# Patient Record
Sex: MALE | Race: WHITE | Employment: OTHER | URBAN - METROPOLITAN AREA
[De-identification: names, ages, dates, MRNs, and addresses within clinical notes are randomized per-mention and may not be internally consistent; named-entity substitution may affect disease eponyms.]

---

## 2017-01-25 ENCOUNTER — NURSE ONLY (OUTPATIENT)
Dept: FAMILY MEDICINE CLINIC | Facility: CLINIC | Age: 67
End: 2017-01-25

## 2017-01-25 DIAGNOSIS — IMO0001 UNCONTROLLED TYPE 2 DIABETES MELLITUS WITHOUT COMPLICATION, WITHOUT LONG-TERM CURRENT USE OF INSULIN: ICD-10-CM

## 2017-01-25 DIAGNOSIS — E78.1 PURE HYPERGLYCERIDEMIA: ICD-10-CM

## 2017-01-25 LAB
ALBUMIN SERPL BCP-MCNC: 4.5 G/DL (ref 3.5–4.8)
ALBUMIN/GLOB SERPL: 2 {RATIO} (ref 1–2)
ALP SERPL-CCNC: 42 U/L (ref 32–100)
ALT SERPL-CCNC: 29 U/L (ref 17–63)
ANION GAP SERPL CALC-SCNC: 11 MMOL/L (ref 0–18)
AST SERPL-CCNC: 23 U/L (ref 15–41)
BILIRUB SERPL-MCNC: 0.9 MG/DL (ref 0.3–1.2)
BUN SERPL-MCNC: 19 MG/DL (ref 8–20)
BUN/CREAT SERPL: 17 (ref 10–20)
CALCIUM SERPL-MCNC: 9.6 MG/DL (ref 8.5–10.5)
CHLORIDE SERPL-SCNC: 104 MMOL/L (ref 95–110)
CHOLEST SERPL-MCNC: 167 MG/DL (ref 110–200)
CO2 SERPL-SCNC: 25 MMOL/L (ref 22–32)
CREAT SERPL-MCNC: 1.12 MG/DL (ref 0.5–1.5)
GLOBULIN PLAS-MCNC: 2.3 G/DL (ref 2.5–3.7)
GLUCOSE SERPL-MCNC: 170 MG/DL (ref 70–99)
HDLC SERPL-MCNC: 31 MG/DL
LDLC SERPL CALC-MCNC: 107 MG/DL (ref 0–99)
NONHDLC SERPL-MCNC: 136 MG/DL
OSMOLALITY UR CALC.SUM OF ELEC: 296 MOSM/KG (ref 275–295)
POTASSIUM SERPL-SCNC: 4.3 MMOL/L (ref 3.3–5.1)
PROT SERPL-MCNC: 6.8 G/DL (ref 5.9–8.4)
SODIUM SERPL-SCNC: 140 MMOL/L (ref 136–144)
TRIGL SERPL-MCNC: 143 MG/DL (ref 1–149)

## 2017-01-25 PROCEDURE — 83036 HEMOGLOBIN GLYCOSYLATED A1C: CPT

## 2017-01-25 PROCEDURE — 80061 LIPID PANEL: CPT

## 2017-01-25 PROCEDURE — 36415 COLL VENOUS BLD VENIPUNCTURE: CPT

## 2017-01-25 PROCEDURE — 80053 COMPREHEN METABOLIC PANEL: CPT

## 2017-01-25 RX ORDER — FENOFIBRATE 160 MG/1
TABLET ORAL
Qty: 30 TABLET | Refills: 0 | Status: SHIPPED | OUTPATIENT
Start: 2017-01-25 | End: 2017-02-02

## 2017-01-26 LAB — HBA1C MFR BLD: 8.1 % (ref 4–6)

## 2017-02-02 ENCOUNTER — OFFICE VISIT (OUTPATIENT)
Dept: FAMILY MEDICINE CLINIC | Facility: CLINIC | Age: 67
End: 2017-02-02

## 2017-02-02 VITALS
OXYGEN SATURATION: 98 % | HEART RATE: 80 BPM | SYSTOLIC BLOOD PRESSURE: 130 MMHG | WEIGHT: 191 LBS | BODY MASS INDEX: 28 KG/M2 | DIASTOLIC BLOOD PRESSURE: 70 MMHG

## 2017-02-02 DIAGNOSIS — E78.1 PURE HYPERGLYCERIDEMIA: ICD-10-CM

## 2017-02-02 DIAGNOSIS — IMO0001 UNCONTROLLED TYPE 2 DIABETES MELLITUS WITHOUT COMPLICATION, WITHOUT LONG-TERM CURRENT USE OF INSULIN: Primary | ICD-10-CM

## 2017-02-02 PROCEDURE — 99213 OFFICE O/P EST LOW 20 MIN: CPT

## 2017-02-02 RX ORDER — PIOGLITAZONEHYDROCHLORIDE 15 MG/1
15 TABLET ORAL DAILY
Qty: 90 TABLET | Refills: 0 | Status: SHIPPED | OUTPATIENT
Start: 2017-02-02 | End: 2017-05-02

## 2017-02-02 RX ORDER — SIMVASTATIN 20 MG
20 TABLET ORAL NIGHTLY
COMMUNITY
End: 2017-05-02

## 2017-02-02 NOTE — PROGRESS NOTES
HPI:    Patient ID: Adi Mckeon is a 73year old male. Diabetes  He presents for his follow-up diabetic visit. He has type 2 diabetes mellitus. Onset time: few years. His disease course has been stable.  Pertinent negatives for hypoglycemia include no MG Oral Tab TK 1 T PO QD Disp:  Rfl: 3   ibuprofen 600 MG Oral Tab TK 1 T PO Q 6 H PRN Disp:  Rfl: 0   Hydrocortisone Ace-Pramoxine 2.5-1 % Rectal Cream Place 1 Application rectally 4 (four) times daily as needed for Hemorrhoids.  Disp: 30 g Rfl: 2     Yousif

## 2017-02-02 NOTE — PATIENT INSTRUCTIONS
Terapia oral para la diabetes tipo 2  Las pastillas contra la diabetes pueden ayudarle a controlar lal nivel de azúcar en la shruthi. Estas pastillas no contienen insulina, laura controlan el nivel de azúcar de varias otras Junior.  Es posible que le den un Estas pastillas ayudan a controlar la cantidad de glucosa en la shruthi. Lo hacen disminuyendo la cantidad de glucosa que produce el hígado y ayudando a los músculos a usar la insulina de manera más eficaz.  Estos medicamentos suelen tomarse con cada comida Estos medicamentos pueden ayudarle a mantener lal nivel de azúcar en la shruthi dentro de los límites deseados. También ayudan al páncreas a producir más insulina y pueden ayudar a los músculos a usar la insulina de forma más eficaz.  Los efectos secundarios

## 2017-02-08 ENCOUNTER — TELEPHONE (OUTPATIENT)
Dept: FAMILY MEDICINE CLINIC | Facility: CLINIC | Age: 67
End: 2017-02-08

## 2017-02-09 RX ORDER — CLOTRIMAZOLE AND BETAMETHASONE DIPROPIONATE 10; .64 MG/G; MG/G
1 CREAM TOPICAL 2 TIMES DAILY PRN
Qty: 60 G | Refills: 1 | Status: SHIPPED | OUTPATIENT
Start: 2017-02-09 | End: 2017-06-09

## 2017-02-09 NOTE — TELEPHONE ENCOUNTER
New medication sent to his pharmacy for his rash, per Dr Karla Jacobo try it and if not better have him come in to refer to a [place] .

## 2017-02-21 RX ORDER — LANCETS
EACH MISCELLANEOUS
Qty: 100 EACH | Refills: 5 | Status: SHIPPED | OUTPATIENT
Start: 2017-02-21 | End: 2017-02-21

## 2017-03-11 PROBLEM — H25.813 COMBINED FORMS OF AGE-RELATED CATARACT OF BOTH EYES: Status: ACTIVE | Noted: 2017-03-03

## 2017-03-11 PROBLEM — H52.4 PRESBYOPIA: Status: ACTIVE | Noted: 2017-03-03

## 2017-03-13 PROBLEM — H26.9 CATARACT: Status: ACTIVE | Noted: 2017-03-03

## 2017-03-14 ENCOUNTER — MED REC SCAN ONLY (OUTPATIENT)
Dept: FAMILY MEDICINE CLINIC | Facility: CLINIC | Age: 67
End: 2017-03-14

## 2017-03-16 ENCOUNTER — TELEPHONE (OUTPATIENT)
Dept: FAMILY MEDICINE CLINIC | Facility: CLINIC | Age: 67
End: 2017-03-16

## 2017-04-26 ENCOUNTER — NURSE ONLY (OUTPATIENT)
Dept: FAMILY MEDICINE CLINIC | Facility: CLINIC | Age: 67
End: 2017-04-26

## 2017-04-26 DIAGNOSIS — IMO0001 UNCONTROLLED TYPE 2 DIABETES MELLITUS WITHOUT COMPLICATION, WITHOUT LONG-TERM CURRENT USE OF INSULIN: ICD-10-CM

## 2017-04-26 PROCEDURE — 80053 COMPREHEN METABOLIC PANEL: CPT

## 2017-04-26 PROCEDURE — 36415 COLL VENOUS BLD VENIPUNCTURE: CPT

## 2017-04-26 PROCEDURE — 83036 HEMOGLOBIN GLYCOSYLATED A1C: CPT

## 2017-05-02 ENCOUNTER — OFFICE VISIT (OUTPATIENT)
Dept: FAMILY MEDICINE CLINIC | Facility: CLINIC | Age: 67
End: 2017-05-02

## 2017-05-02 VITALS
OXYGEN SATURATION: 97 % | SYSTOLIC BLOOD PRESSURE: 128 MMHG | HEART RATE: 87 BPM | DIASTOLIC BLOOD PRESSURE: 70 MMHG | BODY MASS INDEX: 27.7 KG/M2 | WEIGHT: 187 LBS | HEIGHT: 69 IN

## 2017-05-02 DIAGNOSIS — Z00.00 ENCOUNTER FOR MEDICARE ANNUAL WELLNESS EXAM: Primary | ICD-10-CM

## 2017-05-02 DIAGNOSIS — M25.50 MULTIPLE JOINT PAIN: ICD-10-CM

## 2017-05-02 DIAGNOSIS — H52.4 PRESBYOPIA: ICD-10-CM

## 2017-05-02 DIAGNOSIS — H25.813 COMBINED FORMS OF AGE-RELATED CATARACT OF BOTH EYES: ICD-10-CM

## 2017-05-02 DIAGNOSIS — IMO0001 UNCONTROLLED TYPE 2 DIABETES MELLITUS WITHOUT COMPLICATION, WITHOUT LONG-TERM CURRENT USE OF INSULIN: ICD-10-CM

## 2017-05-02 DIAGNOSIS — Z28.21 IMMUNIZATION NOT CARRIED OUT BECAUSE OF PATIENT REFUSAL: ICD-10-CM

## 2017-05-02 DIAGNOSIS — Z13.31 DEPRESSION SCREENING: ICD-10-CM

## 2017-05-02 DIAGNOSIS — E78.2 MIXED HYPERLIPIDEMIA: ICD-10-CM

## 2017-05-02 DIAGNOSIS — Z87.891 FORMER SMOKER: ICD-10-CM

## 2017-05-02 DIAGNOSIS — E66.3 OVERWEIGHT(278.02): ICD-10-CM

## 2017-05-02 DIAGNOSIS — L82.1 OTHER SEBORRHEIC KERATOSIS: ICD-10-CM

## 2017-05-02 DIAGNOSIS — R35.1 NOCTURIA: ICD-10-CM

## 2017-05-02 DIAGNOSIS — D22.9 MELANOCYTIC NEVUS, UNSPECIFIED LOCATION: ICD-10-CM

## 2017-05-02 DIAGNOSIS — R97.20 BPH WITH ELEVATED PSA: ICD-10-CM

## 2017-05-02 DIAGNOSIS — K64.4 EXTERNAL HEMORRHOIDS: ICD-10-CM

## 2017-05-02 DIAGNOSIS — K57.30 DIVERTICULOSIS OF LARGE INTESTINE WITHOUT HEMORRHAGE: ICD-10-CM

## 2017-05-02 DIAGNOSIS — N40.0 BPH WITH ELEVATED PSA: ICD-10-CM

## 2017-05-02 PROCEDURE — G0009 ADMIN PNEUMOCOCCAL VACCINE: HCPCS

## 2017-05-02 PROCEDURE — 99213 OFFICE O/P EST LOW 20 MIN: CPT

## 2017-05-02 PROCEDURE — G0439 PPPS, SUBSEQ VISIT: HCPCS

## 2017-05-02 PROCEDURE — 90670 PCV13 VACCINE IM: CPT

## 2017-05-02 PROCEDURE — G0444 DEPRESSION SCREEN ANNUAL: HCPCS

## 2017-05-02 RX ORDER — SIMVASTATIN 20 MG
20 TABLET ORAL NIGHTLY
Qty: 90 TABLET | Refills: 0 | Status: SHIPPED | OUTPATIENT
Start: 2017-05-02 | End: 2017-08-01

## 2017-05-02 RX ORDER — PIOGLITAZONEHYDROCHLORIDE 15 MG/1
15 TABLET ORAL DAILY
Qty: 90 TABLET | Refills: 0 | Status: SHIPPED | OUTPATIENT
Start: 2017-05-02 | End: 2017-08-01

## 2017-05-02 NOTE — PATIENT INSTRUCTIONS
Josué Morocho's SCREENING SCHEDULE   Tests on this list are recommended by your physician but may not be covered, or covered at this frequency, by your insurer. Please check with your insurance carrier before scheduling to verify coverage.     PREVENTATI cigarettes in their lifetime   • Anyone with a family history    Colorectal Cancer Screening Covered up to Age 76     Colonoscopy Screen   Covered every 10 years- more often if abnormal Colonoscopy,10 Years due on 11/13/2023 Update Health Maintenance if ap abusers     Tetanus Toxoid- Only covered with a cut with metal- TD and TDaP Not covered by Medicare Part B) No orders found for this or any previous visit.  This may be covered with your prescription benefits, but Medicare does not cover unless Medically ne

## 2017-05-03 PROBLEM — Z28.21 IMMUNIZATION NOT CARRIED OUT BECAUSE OF PATIENT REFUSAL: Status: ACTIVE | Noted: 2017-05-03

## 2017-05-03 PROBLEM — Z00.00 ENCOUNTER FOR MEDICARE ANNUAL WELLNESS EXAM: Status: ACTIVE | Noted: 2017-05-03

## 2017-05-03 PROBLEM — Z13.31 DEPRESSION SCREENING: Status: ACTIVE | Noted: 2017-05-03

## 2017-05-03 PROBLEM — Z87.891 FORMER SMOKER: Status: ACTIVE | Noted: 2017-05-03

## 2017-05-03 PROBLEM — K57.30 DIVERTICULOSIS OF LARGE INTESTINE WITHOUT HEMORRHAGE: Status: ACTIVE | Noted: 2017-05-03

## 2017-05-03 NOTE — PROGRESS NOTES
HPI:   Stefanie Cordova is a 73year old male who presents for a Medicare Subsequent Annual Wellness visit (Pt already had Initial Annual Wellness). Pt denies any complaints at this time.       Patient Care Team: Patient Care Team:  Haleigh Howard MD as Angie Ribeiro dysfunction; Encounter for well adult exam with abnormal findings; BPH without obstruction/lower urinary tract symptoms; Overweight; Hemorrhoids without complication; Pneumonia; Diverticulitis of colon; Dermatitis;  Need for vaccination; Postnasal drip; Dys 20/25   Left Eye Visual Acuity: Corrected Left Eye Chart Acuity: 20/25   Both Eyes Visual Acuity: Corrected Both Eyes Chart Acuity: 20/25   Able To Tolerate Visual Acuity: Yes      General Appearance:  Alert, cooperative, no distress, appears stated age colonoscopy done on 1/31/2013 significant for diverticulosis; needs repeat colonoscopy in 10 years. - Pneumococcal (Prevnar 13) (92273) (DX V03.82/Z23)  -     CBC, Platelet, No Differential [E];  Future  -     Urinalysis, Routine [E][If pt <2 yrs old, must (CPT=); Future    Immunization not carried out because of patient refusal  Comments:  Td and Zostavax vaccines refused. Overweight  BMI 27.0-27.9,adult  -     Pt counseled with regards to diet and exercise.        Mr. Abdulaziz Dent does not currently take as without help    Managing money/bills: Able without help    Taking medications as prescribed: Able without help    Are you able to afford your medications?: Yes    Hearing Problems?: No     Functional Status     Hearing Problems?: No    Vision Problems? : N No flowsheet data found.     Fasting Blood Sugar (FSB)Annually   GLUCOSE (mg/dL)   Date Value   04/26/2017 140*   ----------  GLUCOSE (P) (mg/dL)   Date Value   07/26/2016 155*   ----------       Cardiovascular Disease Screening     LDL Annually LDL C (mg/dL)   Date Value   07/26/2016 1.19    No flowsheet data found. Drug Serum Conc  Annually No results found for: DIGOXIN, DIG, VALP No flowsheet data found. Diabetes      HgbA1C  Annually GLYCOHEMOGLOBIN (HGA1C) (L) (%)   Date Value   07/26/2016 7.

## 2017-06-03 ENCOUNTER — HOSPITAL ENCOUNTER (OUTPATIENT)
Dept: ULTRASOUND IMAGING | Age: 67
Discharge: HOME | End: 2017-06-03
Payer: MEDICARE

## 2017-06-03 DIAGNOSIS — Z87.891 FORMER SMOKER: ICD-10-CM

## 2017-06-03 PROCEDURE — 76706 US ABDL AORTA SCREEN AAA: CPT

## 2017-06-09 ENCOUNTER — TELEPHONE (OUTPATIENT)
Dept: FAMILY MEDICINE CLINIC | Facility: CLINIC | Age: 67
End: 2017-06-09

## 2017-06-09 DIAGNOSIS — L30.9 DERMATITIS: Primary | ICD-10-CM

## 2017-06-09 RX ORDER — CLOTRIMAZOLE AND BETAMETHASONE DIPROPIONATE 10; .64 MG/G; MG/G
1 CREAM TOPICAL 2 TIMES DAILY PRN
Qty: 60 G | Refills: 1 | Status: SHIPPED | OUTPATIENT
Start: 2017-06-09 | End: 2018-02-03 | Stop reason: ALTCHOICE

## 2017-06-14 ENCOUNTER — TELEPHONE (OUTPATIENT)
Dept: FAMILY MEDICINE CLINIC | Facility: CLINIC | Age: 67
End: 2017-06-14

## 2017-06-14 DIAGNOSIS — L30.9 DERMATITIS: Primary | ICD-10-CM

## 2017-06-14 NOTE — TELEPHONE ENCOUNTER
Notified patient that I have entered a referral for Dr. Chiara Beard, a new [facility] doctor for him to see. Patient stated that he has seen Dr. Leanne Lemon, and \"it's been four days and it's not helping him. \"  I told him that I am entering a new referral but I don't kn

## 2017-06-20 ENCOUNTER — MED REC SCAN ONLY (OUTPATIENT)
Dept: FAMILY MEDICINE CLINIC | Facility: CLINIC | Age: 67
End: 2017-06-20

## 2017-07-26 ENCOUNTER — NURSE ONLY (OUTPATIENT)
Dept: FAMILY MEDICINE CLINIC | Facility: CLINIC | Age: 67
End: 2017-07-26

## 2017-07-26 DIAGNOSIS — E78.2 MIXED HYPERLIPIDEMIA: ICD-10-CM

## 2017-07-26 DIAGNOSIS — R97.20 BPH WITH ELEVATED PSA: ICD-10-CM

## 2017-07-26 DIAGNOSIS — IMO0001 UNCONTROLLED TYPE 2 DIABETES MELLITUS WITHOUT COMPLICATION, WITHOUT LONG-TERM CURRENT USE OF INSULIN: ICD-10-CM

## 2017-07-26 DIAGNOSIS — N40.0 BPH WITH ELEVATED PSA: ICD-10-CM

## 2017-07-26 DIAGNOSIS — Z00.00 ENCOUNTER FOR MEDICARE ANNUAL WELLNESS EXAM: ICD-10-CM

## 2017-07-26 DIAGNOSIS — R35.1 NOCTURIA: ICD-10-CM

## 2017-07-26 LAB
ALBUMIN SERPL BCP-MCNC: 4 G/DL (ref 3.5–4.8)
ALBUMIN/GLOB SERPL: 1.7 {RATIO} (ref 1–2)
ALP SERPL-CCNC: 45 U/L (ref 32–100)
ALT SERPL-CCNC: 24 U/L (ref 17–63)
ANION GAP SERPL CALC-SCNC: 7 MMOL/L (ref 0–18)
AST SERPL-CCNC: 18 U/L (ref 15–41)
BACTERIA UR QL AUTO: NEGATIVE /HPF
BILIRUB SERPL-MCNC: 0.7 MG/DL (ref 0.3–1.2)
BILIRUB UR QL: NEGATIVE
BUN SERPL-MCNC: 15 MG/DL (ref 8–20)
BUN/CREAT SERPL: 15.2 (ref 10–20)
CALCIUM SERPL-MCNC: 8.9 MG/DL (ref 8.5–10.5)
CHLORIDE SERPL-SCNC: 108 MMOL/L (ref 95–110)
CHOLEST SERPL-MCNC: 175 MG/DL (ref 110–200)
CLARITY UR: CLEAR
CO2 SERPL-SCNC: 22 MMOL/L (ref 22–32)
COLOR UR: YELLOW
CREAT SERPL-MCNC: 0.99 MG/DL (ref 0.5–1.5)
CREAT UR-MCNC: 127.5 MG/DL
ERYTHROCYTE [DISTWIDTH] IN BLOOD BY AUTOMATED COUNT: 13.8 % (ref 11–15)
GLOBULIN PLAS-MCNC: 2.3 G/DL (ref 2.5–3.7)
GLUCOSE SERPL-MCNC: 139 MG/DL (ref 70–99)
GLUCOSE UR-MCNC: NEGATIVE MG/DL
HCT VFR BLD AUTO: 42.1 % (ref 41–52)
HDLC SERPL-MCNC: 25 MG/DL
HGB BLD-MCNC: 14.2 G/DL (ref 13.5–17.5)
KETONES UR-MCNC: NEGATIVE MG/DL
LDLC SERPL CALC-MCNC: 107 MG/DL (ref 0–99)
LEUKOCYTE ESTERASE UR QL STRIP.AUTO: NEGATIVE
MCH RBC QN AUTO: 28.5 PG (ref 27–32)
MCHC RBC AUTO-ENTMCNC: 33.6 G/DL (ref 32–37)
MCV RBC AUTO: 84.7 FL (ref 80–100)
MICROALBUMIN UR-MCNC: 0.2 MG/DL (ref 0–1.8)
MICROALBUMIN/CREAT UR: 1.6 MG/G{CREAT} (ref 0–20)
NITRITE UR QL STRIP.AUTO: NEGATIVE
NONHDLC SERPL-MCNC: 150 MG/DL
OSMOLALITY UR CALC.SUM OF ELEC: 287 MOSM/KG (ref 275–295)
PH UR: 6 [PH] (ref 5–8)
PLATELET # BLD AUTO: 123 K/UL (ref 140–400)
PMV BLD AUTO: 10.1 FL (ref 7.4–10.3)
POTASSIUM SERPL-SCNC: 3.9 MMOL/L (ref 3.3–5.1)
PROT SERPL-MCNC: 6.3 G/DL (ref 5.9–8.4)
PROT UR-MCNC: NEGATIVE MG/DL
PSA SERPL-MCNC: 1.4 NG/ML (ref 0–4)
RBC # BLD AUTO: 4.97 M/UL (ref 4.5–5.9)
RBC #/AREA URNS AUTO: 5 /HPF
SODIUM SERPL-SCNC: 137 MMOL/L (ref 136–144)
SP GR UR STRIP: 1.02 (ref 1–1.03)
TRIGL SERPL-MCNC: 216 MG/DL (ref 1–149)
UROBILINOGEN UR STRIP-ACNC: <2
VIT C UR-MCNC: NEGATIVE MG/DL
WBC # BLD AUTO: 5.5 K/UL (ref 4–11)
WBC #/AREA URNS AUTO: 1 /HPF

## 2017-07-26 PROCEDURE — 80053 COMPREHEN METABOLIC PANEL: CPT

## 2017-07-26 PROCEDURE — 36415 COLL VENOUS BLD VENIPUNCTURE: CPT

## 2017-07-26 PROCEDURE — 82570 ASSAY OF URINE CREATININE: CPT

## 2017-07-26 PROCEDURE — 83036 HEMOGLOBIN GLYCOSYLATED A1C: CPT

## 2017-07-26 PROCEDURE — 85027 COMPLETE CBC AUTOMATED: CPT

## 2017-07-26 PROCEDURE — 82043 UR ALBUMIN QUANTITATIVE: CPT

## 2017-07-26 PROCEDURE — 80061 LIPID PANEL: CPT

## 2017-07-26 PROCEDURE — 81001 URINALYSIS AUTO W/SCOPE: CPT

## 2017-07-26 NOTE — PROGRESS NOTES
Patient presented to clinic for routine and DM blood work. Orders verified in patient chart. Name and  verified. Patient is fasting. Blood drawn from the right antecubital, tolerated well without complications.

## 2017-07-27 LAB — HBA1C MFR BLD: 6.6 % (ref 4–6)

## 2017-08-01 ENCOUNTER — OFFICE VISIT (OUTPATIENT)
Dept: FAMILY MEDICINE CLINIC | Facility: CLINIC | Age: 67
End: 2017-08-01

## 2017-08-01 VITALS
WEIGHT: 188 LBS | HEART RATE: 82 BPM | DIASTOLIC BLOOD PRESSURE: 80 MMHG | RESPIRATION RATE: 18 BRPM | HEIGHT: 69 IN | BODY MASS INDEX: 27.85 KG/M2 | SYSTOLIC BLOOD PRESSURE: 122 MMHG | OXYGEN SATURATION: 97 %

## 2017-08-01 DIAGNOSIS — E11.9 CONTROLLED TYPE 2 DIABETES MELLITUS WITHOUT COMPLICATION, WITHOUT LONG-TERM CURRENT USE OF INSULIN (HCC): Primary | ICD-10-CM

## 2017-08-01 DIAGNOSIS — E78.2 MIXED HYPERLIPIDEMIA: ICD-10-CM

## 2017-08-01 PROCEDURE — 99213 OFFICE O/P EST LOW 20 MIN: CPT

## 2017-08-01 RX ORDER — SIMVASTATIN 40 MG
40 TABLET ORAL NIGHTLY
Qty: 90 TABLET | Refills: 0 | Status: SHIPPED | OUTPATIENT
Start: 2017-08-01 | End: 2017-09-18

## 2017-08-01 RX ORDER — PIOGLITAZONEHYDROCHLORIDE 15 MG/1
15 TABLET ORAL DAILY
Qty: 90 TABLET | Refills: 0 | Status: SHIPPED | OUTPATIENT
Start: 2017-08-01 | End: 2017-10-31

## 2017-08-01 RX ORDER — TACROLIMUS 1 MG/G
OINTMENT TOPICAL
Refills: 1 | COMMUNITY
Start: 2017-07-25 | End: 2018-02-03 | Stop reason: ALTCHOICE

## 2017-08-01 NOTE — PATIENT INSTRUCTIONS
Programa para el cuidado de los pies en personas con diabetes    Usted depende constantemente de sergio pies para moverse de un lado a otro.  Sin embargo, si tiene diabetes, sergio pies necesitan un cuidado especial. Incluso un problema pequeño en un pie puede Alondra examen determina el estado de diversas partes de lal pie. En primer lugar, le revisarán lal piel y uñas en busca de signos de infección. Se comprobará la circulación de la shruthi tomando el pulso en ambos pies.  Es posible que le kim pruebas para estud Cuando usted tiene diabetes, es preferible prevenir los problemas que tener que tratarlos más adelante. Por esta razón, visite a lal equipo de atención médica para hacerse chequeos y cuidar de sergio pies regularmente.  Lal equipo de [employer] pued

## 2017-08-01 NOTE — PROGRESS NOTES
HPI:    Patient ID: Benita Coyle is a 78year old male. Diabetes   He presents for his follow-up diabetic visit. He has type 2 diabetes mellitus. Onset time: few years. His disease course has been stable.  Pertinent negatives for hypoglycemia shortness of breath. Cardiovascular: Negative for chest pain and palpitations. Gastrointestinal: Negative for abdominal pain, constipation, diarrhea, nausea and vomiting. Endocrine: Negative for polydipsia, polyphagia and polyuria.    Genitourinary: normal and breath sounds normal. No respiratory distress.    Musculoskeletal:        Right foot: Normal.        Left foot: Normal.   Bilateral barefoot skin diabetic exam is normal, visualized feet and the appearance is normal.  Bilateral monofilament/sensa

## 2017-08-23 ENCOUNTER — MED REC SCAN ONLY (OUTPATIENT)
Dept: FAMILY MEDICINE CLINIC | Facility: CLINIC | Age: 67
End: 2017-08-23

## 2017-09-05 ENCOUNTER — MED REC SCAN ONLY (OUTPATIENT)
Dept: FAMILY MEDICINE CLINIC | Facility: CLINIC | Age: 67
End: 2017-09-05

## 2017-09-18 DIAGNOSIS — IMO0001 UNCONTROLLED TYPE 2 DIABETES MELLITUS WITHOUT COMPLICATION, WITHOUT LONG-TERM CURRENT USE OF INSULIN: ICD-10-CM

## 2017-09-18 DIAGNOSIS — E78.2 MIXED HYPERLIPIDEMIA: ICD-10-CM

## 2017-09-18 RX ORDER — SIMVASTATIN 40 MG
40 TABLET ORAL NIGHTLY
Qty: 30 TABLET | Refills: 0 | Status: SHIPPED | OUTPATIENT
Start: 2017-09-18 | End: 2017-10-31

## 2017-10-09 ENCOUNTER — TELEPHONE (OUTPATIENT)
Dept: FAMILY MEDICINE CLINIC | Facility: CLINIC | Age: 67
End: 2017-10-09

## 2017-10-10 DIAGNOSIS — IMO0001 UNCONTROLLED TYPE 2 DIABETES MELLITUS WITHOUT COMPLICATION, WITHOUT LONG-TERM CURRENT USE OF INSULIN: ICD-10-CM

## 2017-10-10 NOTE — TELEPHONE ENCOUNTER
Called pharmacy to verify the status of the refill for the Metformin. It was prescribed back in August with a 90-day supply.   Per pharmacy, patient's last fill of the prescription was back in May, and that they did not receive the prescription for August.

## 2017-10-12 DIAGNOSIS — IMO0001 UNCONTROLLED TYPE 2 DIABETES MELLITUS WITHOUT COMPLICATION, WITHOUT LONG-TERM CURRENT USE OF INSULIN: ICD-10-CM

## 2017-10-25 ENCOUNTER — NURSE ONLY (OUTPATIENT)
Dept: FAMILY MEDICINE CLINIC | Facility: CLINIC | Age: 67
End: 2017-10-25

## 2017-10-25 DIAGNOSIS — E78.2 MIXED HYPERLIPIDEMIA: ICD-10-CM

## 2017-10-25 DIAGNOSIS — E11.9 CONTROLLED TYPE 2 DIABETES MELLITUS WITHOUT COMPLICATION, WITHOUT LONG-TERM CURRENT USE OF INSULIN (HCC): ICD-10-CM

## 2017-10-25 PROCEDURE — 36415 COLL VENOUS BLD VENIPUNCTURE: CPT

## 2017-10-25 PROCEDURE — 80061 LIPID PANEL: CPT

## 2017-10-25 PROCEDURE — 83036 HEMOGLOBIN GLYCOSYLATED A1C: CPT

## 2017-10-25 PROCEDURE — 80053 COMPREHEN METABOLIC PANEL: CPT

## 2017-10-31 ENCOUNTER — OFFICE VISIT (OUTPATIENT)
Dept: FAMILY MEDICINE CLINIC | Facility: CLINIC | Age: 67
End: 2017-10-31

## 2017-10-31 VITALS
HEART RATE: 71 BPM | BODY MASS INDEX: 26.96 KG/M2 | OXYGEN SATURATION: 98 % | SYSTOLIC BLOOD PRESSURE: 110 MMHG | HEIGHT: 69 IN | WEIGHT: 182 LBS | DIASTOLIC BLOOD PRESSURE: 70 MMHG

## 2017-10-31 DIAGNOSIS — E78.2 MIXED HYPERLIPIDEMIA: ICD-10-CM

## 2017-10-31 DIAGNOSIS — E11.9 CONTROLLED TYPE 2 DIABETES MELLITUS WITHOUT COMPLICATION, WITHOUT LONG-TERM CURRENT USE OF INSULIN (HCC): Primary | ICD-10-CM

## 2017-10-31 PROCEDURE — 99213 OFFICE O/P EST LOW 20 MIN: CPT

## 2017-10-31 RX ORDER — SIMVASTATIN 40 MG
40 TABLET ORAL NIGHTLY
Qty: 90 TABLET | Refills: 1 | Status: SHIPPED | OUTPATIENT
Start: 2017-10-31 | End: 2018-02-02

## 2017-10-31 RX ORDER — PIOGLITAZONEHYDROCHLORIDE 15 MG/1
15 TABLET ORAL DAILY
Qty: 90 TABLET | Refills: 0 | Status: SHIPPED | OUTPATIENT
Start: 2017-10-31 | End: 2018-02-02

## 2017-11-01 NOTE — PROGRESS NOTES
HPI:    Patient ID: Martinez Cool is a 78year old male. Diabetes   He presents for his follow-up diabetic visit. He has type 2 diabetes mellitus. Onset time: few years. His disease course has been stable.  Pertinent negatives for hypoglycemia shortness of breath. Cardiovascular: Negative for chest pain and palpitations. Gastrointestinal: Negative for abdominal pain, constipation, diarrhea, nausea and vomiting. Endocrine: Negative for polydipsia, polyphagia and polyuria.    Genitourinary: normal and breath sounds normal. No respiratory distress. Neurological: He is alert and oriented to person, place, and time. Skin: Skin is warm. No rash noted. Nursing note and vitals reviewed. ASSESSMENT/PLAN:   1.  Controlled type 2 diab

## 2017-11-01 NOTE — PATIENT INSTRUCTIONS
Qué pedir en un restaurante si tiene diabetes    [place] kedar es kirill parte importante de mantener el azúcar en la shruthi en los niveles deseables. Usted solo necesita hacer elecciones saludables.  Si come fuera de lal casa:  Pruebe estas sugerencias  · Inten · Papa al horno con verduras o hierbas  · Vaughn asado o al horno (sin piel)  · Verduras al vapor  Comida asiática  · Dumplings o potstickers al vapor  · Preeti o pescados, al horno, sancochados o al vapor  · Sushi o sashimi  · Arroz al vapor o fideos hervi

## 2018-01-29 ENCOUNTER — NURSE ONLY (OUTPATIENT)
Dept: FAMILY MEDICINE CLINIC | Facility: CLINIC | Age: 68
End: 2018-01-29

## 2018-01-29 DIAGNOSIS — E11.9 CONTROLLED TYPE 2 DIABETES MELLITUS WITHOUT COMPLICATION, WITHOUT LONG-TERM CURRENT USE OF INSULIN (HCC): ICD-10-CM

## 2018-01-29 LAB
ALBUMIN SERPL BCP-MCNC: 4.5 G/DL (ref 3.5–4.8)
ALBUMIN/GLOB SERPL: 2.1 {RATIO} (ref 1–2)
ALP SERPL-CCNC: 39 U/L (ref 32–100)
ALT SERPL-CCNC: 23 U/L (ref 17–63)
ANION GAP SERPL CALC-SCNC: 9 MMOL/L (ref 0–18)
AST SERPL-CCNC: 20 U/L (ref 15–41)
BILIRUB SERPL-MCNC: 1.1 MG/DL (ref 0.3–1.2)
BUN SERPL-MCNC: 18 MG/DL (ref 8–20)
BUN/CREAT SERPL: 17.5 (ref 10–20)
CALCIUM SERPL-MCNC: 9.4 MG/DL (ref 8.5–10.5)
CHLORIDE SERPL-SCNC: 104 MMOL/L (ref 95–110)
CO2 SERPL-SCNC: 25 MMOL/L (ref 22–32)
CREAT SERPL-MCNC: 1.03 MG/DL (ref 0.5–1.5)
GLOBULIN PLAS-MCNC: 2.1 G/DL (ref 2.5–3.7)
GLUCOSE SERPL-MCNC: 141 MG/DL (ref 70–99)
OSMOLALITY UR CALC.SUM OF ELEC: 290 MOSM/KG (ref 275–295)
POTASSIUM SERPL-SCNC: 4.1 MMOL/L (ref 3.3–5.1)
PROT SERPL-MCNC: 6.6 G/DL (ref 5.9–8.4)
SODIUM SERPL-SCNC: 138 MMOL/L (ref 136–144)

## 2018-01-29 PROCEDURE — 83036 HEMOGLOBIN GLYCOSYLATED A1C: CPT

## 2018-01-29 PROCEDURE — 36415 COLL VENOUS BLD VENIPUNCTURE: CPT

## 2018-01-29 PROCEDURE — 80053 COMPREHEN METABOLIC PANEL: CPT

## 2018-01-30 LAB — HBA1C MFR BLD: 7 % (ref 4–6)

## 2018-02-02 ENCOUNTER — OFFICE VISIT (OUTPATIENT)
Dept: FAMILY MEDICINE CLINIC | Facility: CLINIC | Age: 68
End: 2018-02-02

## 2018-02-02 VITALS
WEIGHT: 191 LBS | HEART RATE: 85 BPM | DIASTOLIC BLOOD PRESSURE: 76 MMHG | BODY MASS INDEX: 28 KG/M2 | SYSTOLIC BLOOD PRESSURE: 124 MMHG | OXYGEN SATURATION: 98 %

## 2018-02-02 DIAGNOSIS — E78.2 MIXED HYPERLIPIDEMIA: ICD-10-CM

## 2018-02-02 DIAGNOSIS — R97.20 BPH WITH ELEVATED PSA: ICD-10-CM

## 2018-02-02 DIAGNOSIS — E11.9 CONTROLLED TYPE 2 DIABETES MELLITUS WITHOUT COMPLICATION, WITHOUT LONG-TERM CURRENT USE OF INSULIN (HCC): Primary | ICD-10-CM

## 2018-02-02 DIAGNOSIS — R35.1 NOCTURIA: ICD-10-CM

## 2018-02-02 DIAGNOSIS — N40.0 BPH WITH ELEVATED PSA: ICD-10-CM

## 2018-02-02 PROCEDURE — 99213 OFFICE O/P EST LOW 20 MIN: CPT

## 2018-02-02 RX ORDER — TAMSULOSIN HYDROCHLORIDE 0.4 MG/1
0.4 CAPSULE ORAL 2 TIMES DAILY
Qty: 180 CAPSULE | Refills: 1 | Status: SHIPPED | OUTPATIENT
Start: 2018-02-02 | End: 2018-05-08

## 2018-02-02 RX ORDER — PIOGLITAZONEHYDROCHLORIDE 15 MG/1
15 TABLET ORAL DAILY
Qty: 90 TABLET | Refills: 0 | Status: SHIPPED | OUTPATIENT
Start: 2018-02-02 | End: 2018-05-07

## 2018-02-02 RX ORDER — SIMVASTATIN 40 MG
40 TABLET ORAL NIGHTLY
Qty: 90 TABLET | Refills: 1 | Status: SHIPPED | OUTPATIENT
Start: 2018-02-02 | End: 2018-08-15

## 2018-02-03 PROBLEM — Z00.00 ENCOUNTER FOR MEDICARE ANNUAL WELLNESS EXAM: Status: RESOLVED | Noted: 2017-05-03 | Resolved: 2018-02-03

## 2018-02-03 NOTE — PATIENT INSTRUCTIONS
Recursos para las personas con diabetes    Vivir con diabetes significa hacer muchos cambios en lal hilario, y esos cambios pueden parecer abrumadores. Es kirill reacción normal. Cuando se sienta deprimido, acuda a lal josh y a sergio amigos.  Lal equipo de atenci Estas organizaciones proporcionan información, programas educativos y otros servicios. Mart función es ayudarle. · [address] [facility] [phone]. diabetes. org  · National Diabetes Information Clearinghouse [facility] Coordinador del Institu

## 2018-02-27 ENCOUNTER — OFFICE VISIT (OUTPATIENT)
Dept: FAMILY MEDICINE CLINIC | Facility: CLINIC | Age: 68
End: 2018-02-27

## 2018-02-27 VITALS
DIASTOLIC BLOOD PRESSURE: 68 MMHG | HEART RATE: 81 BPM | OXYGEN SATURATION: 98 % | BODY MASS INDEX: 28 KG/M2 | WEIGHT: 187 LBS | SYSTOLIC BLOOD PRESSURE: 126 MMHG

## 2018-02-27 DIAGNOSIS — D17.1 LIPOMA OF ABDOMINAL WALL: Primary | ICD-10-CM

## 2018-02-27 PROCEDURE — 99212 OFFICE O/P EST SF 10 MIN: CPT

## 2018-02-27 NOTE — PROGRESS NOTES
HPI:    Patient ID: Nando Kincaid is a 78year old male. Mass   This is a chronic problem. Episode onset: 2 years ago. The problem occurs constantly. The problem has been gradually worsening (getting bigger).  Pertinent negatives include no abd capsule (0.4 mg total) by mouth 2 (two) times daily.  Disp: 180 capsule Rfl: 1   ELMO CONTOUR NEXT TEST In Vitro Strip TEST BID Disp:  Rfl: 3   MICROLET LANCETS Does not apply Misc USE AS DIRECTED TWICE DAILY Disp: 100 each Rfl: 5   ibuprofen 600 MG Oral T

## 2018-02-27 NOTE — PATIENT INSTRUCTIONS
¿Qué es un lipoma? Un lipoma es un bulto law formado por grasa que se encuentra debajo de la piel. No se trata de cáncer. Se siente blando silvia si fuera de goma al presionarlo. En la [employer], no duele.  Algunas personas tienen más de u [place] si el lipoma le causa dolor o si quiere sacárselo por motivos cosméticos, puede extirparse [place]. Esta cirugía se llama escisión. Lo más probable es que el lipoma no vuelva a crecer después de la cirugía.  Tita la Daniel Strickland

## 2018-05-03 ENCOUNTER — NURSE ONLY (OUTPATIENT)
Dept: FAMILY MEDICINE CLINIC | Facility: CLINIC | Age: 68
End: 2018-05-03

## 2018-05-03 DIAGNOSIS — E11.9 CONTROLLED TYPE 2 DIABETES MELLITUS WITHOUT COMPLICATION, WITHOUT LONG-TERM CURRENT USE OF INSULIN (HCC): ICD-10-CM

## 2018-05-03 DIAGNOSIS — D64.9 ANEMIA, UNSPECIFIED TYPE: Primary | ICD-10-CM

## 2018-05-03 PROCEDURE — 85025 COMPLETE CBC W/AUTO DIFF WBC: CPT

## 2018-05-03 PROCEDURE — 83036 HEMOGLOBIN GLYCOSYLATED A1C: CPT

## 2018-05-03 PROCEDURE — 36415 COLL VENOUS BLD VENIPUNCTURE: CPT

## 2018-05-03 PROCEDURE — 80053 COMPREHEN METABOLIC PANEL: CPT

## 2018-05-03 NOTE — PROGRESS NOTES
Patient presented to clinic for DM blood work. Orders verified in patient chart. Name and  verified. Patient is fasting. Blood drawn from the left antecubital, tolerated well without complications.     Patient states he was in the hospital with low he

## 2018-05-07 ENCOUNTER — OFFICE VISIT (OUTPATIENT)
Dept: FAMILY MEDICINE CLINIC | Facility: CLINIC | Age: 68
End: 2018-05-07

## 2018-05-07 VITALS
OXYGEN SATURATION: 98 % | HEART RATE: 76 BPM | BODY MASS INDEX: 27.55 KG/M2 | WEIGHT: 186 LBS | SYSTOLIC BLOOD PRESSURE: 108 MMHG | RESPIRATION RATE: 17 BRPM | DIASTOLIC BLOOD PRESSURE: 60 MMHG | HEIGHT: 69 IN

## 2018-05-07 DIAGNOSIS — Z13.31 DEPRESSION SCREENING: ICD-10-CM

## 2018-05-07 DIAGNOSIS — Z87.891 FORMER SMOKER: ICD-10-CM

## 2018-05-07 DIAGNOSIS — H25.813 COMBINED FORMS OF AGE-RELATED CATARACT OF BOTH EYES: ICD-10-CM

## 2018-05-07 DIAGNOSIS — R35.1 NOCTURIA: ICD-10-CM

## 2018-05-07 DIAGNOSIS — M15.9 PRIMARY OSTEOARTHRITIS INVOLVING MULTIPLE JOINTS: ICD-10-CM

## 2018-05-07 DIAGNOSIS — Z11.59 NEED FOR HEPATITIS C SCREENING TEST: ICD-10-CM

## 2018-05-07 DIAGNOSIS — R97.20 BPH WITH ELEVATED PSA: ICD-10-CM

## 2018-05-07 DIAGNOSIS — D22.9 MELANOCYTIC NEVUS, UNSPECIFIED LOCATION: ICD-10-CM

## 2018-05-07 DIAGNOSIS — D17.1 LIPOMA OF ABDOMINAL WALL: ICD-10-CM

## 2018-05-07 DIAGNOSIS — K21.9 GERD WITHOUT ESOPHAGITIS: ICD-10-CM

## 2018-05-07 DIAGNOSIS — K64.4 EXTERNAL HEMORRHOIDS: ICD-10-CM

## 2018-05-07 DIAGNOSIS — K62.5 BRBPR (BRIGHT RED BLOOD PER RECTUM): ICD-10-CM

## 2018-05-07 DIAGNOSIS — Z00.00 ENCOUNTER FOR MEDICARE ANNUAL WELLNESS EXAM: Primary | ICD-10-CM

## 2018-05-07 DIAGNOSIS — E66.3 OVERWEIGHT (BMI 25.0-29.9): ICD-10-CM

## 2018-05-07 DIAGNOSIS — H52.4 PRESBYOPIA: ICD-10-CM

## 2018-05-07 DIAGNOSIS — K57.30 DIVERTICULOSIS OF LARGE INTESTINE WITHOUT HEMORRHAGE: ICD-10-CM

## 2018-05-07 DIAGNOSIS — E78.2 MIXED HYPERLIPIDEMIA: ICD-10-CM

## 2018-05-07 DIAGNOSIS — N40.0 BPH WITH ELEVATED PSA: ICD-10-CM

## 2018-05-07 DIAGNOSIS — D62 ANEMIA DUE TO ACUTE BLOOD LOSS: ICD-10-CM

## 2018-05-07 DIAGNOSIS — E11.9 CONTROLLED TYPE 2 DIABETES MELLITUS WITHOUT COMPLICATION, WITHOUT LONG-TERM CURRENT USE OF INSULIN (HCC): ICD-10-CM

## 2018-05-07 PROCEDURE — 90732 PPSV23 VACC 2 YRS+ SUBQ/IM: CPT

## 2018-05-07 PROCEDURE — G0439 PPPS, SUBSEQ VISIT: HCPCS

## 2018-05-07 PROCEDURE — G0009 ADMIN PNEUMOCOCCAL VACCINE: HCPCS

## 2018-05-07 PROCEDURE — 99213 OFFICE O/P EST LOW 20 MIN: CPT

## 2018-05-07 RX ORDER — PANTOPRAZOLE SODIUM 40 MG/1
TABLET, DELAYED RELEASE ORAL
COMMUNITY
Start: 2018-04-28 | End: 2018-07-06

## 2018-05-08 PROBLEM — K21.9 GERD WITHOUT ESOPHAGITIS: Status: ACTIVE | Noted: 2018-04-24

## 2018-05-08 PROBLEM — D62 ANEMIA DUE TO ACUTE BLOOD LOSS: Status: ACTIVE | Noted: 2018-05-08

## 2018-05-08 PROBLEM — Z11.59 NEED FOR HEPATITIS C SCREENING TEST: Status: ACTIVE | Noted: 2018-05-08

## 2018-05-08 RX ORDER — TAMSULOSIN HYDROCHLORIDE 0.4 MG/1
0.4 CAPSULE ORAL 2 TIMES DAILY
Qty: 180 CAPSULE | Refills: 1 | Status: SHIPPED | OUTPATIENT
Start: 2018-05-08 | End: 2018-05-08

## 2018-05-08 RX ORDER — TAMSULOSIN HYDROCHLORIDE 0.4 MG/1
0.4 CAPSULE ORAL 2 TIMES DAILY
Qty: 180 CAPSULE | Refills: 3 | Status: SHIPPED | OUTPATIENT
Start: 2018-05-08 | End: 2019-05-10

## 2018-05-08 RX ORDER — FINASTERIDE 5 MG/1
5 TABLET, FILM COATED ORAL DAILY
Qty: 90 TABLET | Refills: 3 | Status: SHIPPED | OUTPATIENT
Start: 2018-05-08 | End: 2019-05-10

## 2018-05-08 NOTE — PROGRESS NOTES
HPI:   Louis Tineo is a 80year old male who presents for a Medicare Subsequent Annual Wellness visit (Pt already had Initial Annual Wellness).  Pt denies any acute complaints at this time, but mentions that was recently admitted at OSH for ep Packs/day: 0.25      Years: 20.00     Smokeless tobacco: Never Used                      Comment: quit in 2015         CAGE Alcohol screening   Merline Agent was screened for Alcohol abuse and had a score of Kathleen López MD:  finasteride 5 MG Oral Tab Take 1 tablet (5 mg total) by mouth daily. tamsulosin HCl 0.4 MG Oral Cap Take 1 capsule (0.4 mg total) by mouth 2 (two) times daily.    MetFORMIN HCl 1000 MG Oral Tab Take 1 tablet (1,000 mg total) by mouth 2 (tw allergy or asthma    EXAM:   /60 (BP Location: Right arm, Patient Position: Sitting, Cuff Size: adult)   Pulse 76   Resp 17   Ht 69\"   Wt 186 lb   SpO2 98%   BMI 27.47 kg/m²   Estimated body mass index is 27.47 kg/m² as calculated from the following flank area   Lymph nodes: Cervical, supraclavicular, and axillary nodes normal   Neurologic: Normal            Vaccination History     Immunization History   Administered Date(s) Administered   • Influenza Vaccine, High Dose, Preserv Free 09/30/2016   • Pn mg total) by mouth daily. -     tamsulosin HCl 0.4 MG Oral Cap; Take 1 capsule (0.4 mg total) by mouth 2 (two) times daily.     GERD without esophagitis  -     Pt advised to avoid excess intake of spicy/acidic/caffeinated foods.  - Continue Pantoprazole as (H)     Glycohemoglobin (HgA1c) (%)   Date Value   05/03/2018 5.3       No flowsheet data found.     Fasting Blood Sugar (FSB)Annually   Glucose (mg/dL)   Date Value   05/03/2018 128 (H)   ----------  GLUCOSE (P) (mg/dL)   Date Value   07/26/2016 155 (H) benefits, but Medicare does not cover unless Medically needed    Zoster   Not covered by Medicare Part B No vaccine history found This may be covered with your pharmacy  prescription benefits      SPECIFIC DISEASE MONITORING Internal Lab or Procedure Exter

## 2018-05-08 NOTE — PATIENT INSTRUCTIONS
[address] SCREENING SCHEDULE   Tests on this list are recommended by your physician but may not be covered, or covered at this frequency, by your insurer. Please check with your insurance carrier before scheduling to verify coverage.     P than 100 cigarettes in their lifetime   • Anyone with a family history    Colorectal Cancer Screening Covered up to Age 76     Colonoscopy Screen   Covered every 10 years- more often if abnormal Colonoscopy,10 Years due on 04/24/2028 Update [id number] [address] [place] same house as a HepB virus carrier   Homosexual men   Illicit injectable drug abusers     Tetanus Toxoid- Only covered with a cut with metal- TD and TDaP Not covered by Medicare Part B) No orders found for this or any previous visit.  This may be covered wi

## 2018-05-21 ENCOUNTER — TELEPHONE (OUTPATIENT)
Dept: FAMILY MEDICINE CLINIC | Facility: CLINIC | Age: 68
End: 2018-05-21

## 2018-05-21 DIAGNOSIS — E11.9 TYPE 2 DIABETES MELLITUS WITHOUT COMPLICATION, WITHOUT LONG-TERM CURRENT USE OF INSULIN (HCC): Primary | ICD-10-CM

## 2018-06-18 ENCOUNTER — OFFICE VISIT (OUTPATIENT)
Dept: FAMILY MEDICINE CLINIC | Facility: CLINIC | Age: 68
End: 2018-06-18

## 2018-06-18 VITALS
DIASTOLIC BLOOD PRESSURE: 80 MMHG | SYSTOLIC BLOOD PRESSURE: 126 MMHG | HEART RATE: 73 BPM | TEMPERATURE: 98 F | OXYGEN SATURATION: 98 %

## 2018-06-18 DIAGNOSIS — R09.82 POSTNASAL DRIP: Primary | ICD-10-CM

## 2018-06-18 PROBLEM — K62.5 BRBPR (BRIGHT RED BLOOD PER RECTUM): Status: RESOLVED | Noted: 2018-05-07 | Resolved: 2018-06-18

## 2018-06-18 PROBLEM — D62 ANEMIA DUE TO ACUTE BLOOD LOSS: Status: RESOLVED | Noted: 2018-05-08 | Resolved: 2018-06-18

## 2018-06-18 PROBLEM — Z00.00 ENCOUNTER FOR MEDICARE ANNUAL WELLNESS EXAM: Status: RESOLVED | Noted: 2017-05-03 | Resolved: 2018-06-18

## 2018-06-18 PROBLEM — Z13.31 DEPRESSION SCREENING: Status: RESOLVED | Noted: 2017-05-03 | Resolved: 2018-06-18

## 2018-06-18 PROCEDURE — 99213 OFFICE O/P EST LOW 20 MIN: CPT

## 2018-06-18 RX ORDER — PROMETHAZINE HYDROCHLORIDE AND CODEINE PHOSPHATE 6.25; 1 MG/5ML; MG/5ML
5 SYRUP ORAL EVERY 6 HOURS PRN
Qty: 240 ML | Refills: 0 | Status: SHIPPED | OUTPATIENT
Start: 2018-06-18 | End: 2018-08-15 | Stop reason: ALTCHOICE

## 2018-06-18 NOTE — PATIENT INSTRUCTIONS
Enfermedad Respiratoria Viral [Uri, Viral Respiratory Illness, Adult, No Abx]  Usted tiene kirill enfermedad respiratoria de las vías superiores provocada por un virus. Esta enfermedad es contagiosa matheus los primeros días.  Se transmite por el aire, por l Busque Prontamente Atención Médica  si algo de lo siguiente ocurre:  -- Tos con esputo de color (mucosidad) o con shruthi.   -- Dolor en el pecho, falta de aire, silbidos o dificultad para respirar.  -- Dolor de carlos oscar, dolor en la shanthi, el helga o l

## 2018-06-18 NOTE — PROGRESS NOTES
HPI:    Patient ID: Yola Murguia is a 80year old male. Cough   This is a new problem. Episode onset: 3 days ago. The problem has been unchanged. The problem occurs every few minutes. The cough is non-productive.  Associated symptoms include n Skin: Negative for rash. Neurological: Negative for dizziness, syncope, light-headedness and headaches. All other systems reviewed and are negative.              Current Outpatient Prescriptions:  promethazine-codeine 6.25-10 MG/5ML Oral Syrup Take 5 ASSESSMENT/PLAN:   1. Postnasal drip  Pt reassured and all questions answered. Clinical course, prognosis, and treatment options of disease process discussed with pt. Keep well hydrated. PO intake as tolerated.   Tylenol/Motrin for pain/feve

## 2018-06-28 DIAGNOSIS — R09.82 POSTNASAL DRIP: ICD-10-CM

## 2018-06-28 RX ORDER — PROMETHAZINE HYDROCHLORIDE AND CODEINE PHOSPHATE 6.25; 1 MG/5ML; MG/5ML
SYRUP ORAL
Qty: 240 ML | Refills: 0 | OUTPATIENT
Start: 2018-06-28

## 2018-08-10 ENCOUNTER — NURSE ONLY (OUTPATIENT)
Dept: FAMILY MEDICINE CLINIC | Facility: CLINIC | Age: 68
End: 2018-08-10
Payer: MEDICARE

## 2018-08-10 DIAGNOSIS — Z11.59 NEED FOR HEPATITIS C SCREENING TEST: ICD-10-CM

## 2018-08-10 DIAGNOSIS — D62 ANEMIA DUE TO ACUTE BLOOD LOSS: ICD-10-CM

## 2018-08-10 DIAGNOSIS — E11.9 CONTROLLED TYPE 2 DIABETES MELLITUS WITHOUT COMPLICATION, WITHOUT LONG-TERM CURRENT USE OF INSULIN (HCC): ICD-10-CM

## 2018-08-10 DIAGNOSIS — N40.0 BPH WITH ELEVATED PSA: ICD-10-CM

## 2018-08-10 DIAGNOSIS — Z00.00 ENCOUNTER FOR MEDICARE ANNUAL WELLNESS EXAM: ICD-10-CM

## 2018-08-10 DIAGNOSIS — R97.20 BPH WITH ELEVATED PSA: ICD-10-CM

## 2018-08-10 DIAGNOSIS — E78.2 MIXED HYPERLIPIDEMIA: ICD-10-CM

## 2018-08-10 LAB
ALBUMIN SERPL BCP-MCNC: 4.3 G/DL (ref 3.5–4.8)
ALBUMIN/GLOB SERPL: 2 {RATIO} (ref 1–2)
ALP SERPL-CCNC: 43 U/L (ref 32–100)
ALT SERPL-CCNC: 20 U/L (ref 17–63)
ANION GAP SERPL CALC-SCNC: 8 MMOL/L (ref 0–18)
AST SERPL-CCNC: 16 U/L (ref 15–41)
BACTERIA UR QL AUTO: NEGATIVE /HPF
BILIRUB SERPL-MCNC: 0.6 MG/DL (ref 0.3–1.2)
BILIRUB UR QL: NEGATIVE
BUN SERPL-MCNC: 22 MG/DL (ref 8–20)
BUN/CREAT SERPL: 20.8 (ref 10–20)
CALCIUM SERPL-MCNC: 9.1 MG/DL (ref 8.5–10.5)
CHLORIDE SERPL-SCNC: 106 MMOL/L (ref 95–110)
CHOLEST SERPL-MCNC: 122 MG/DL (ref 110–200)
CLARITY UR: CLEAR
CO2 SERPL-SCNC: 24 MMOL/L (ref 22–32)
COLOR UR: YELLOW
CREAT SERPL-MCNC: 1.06 MG/DL (ref 0.5–1.5)
CREAT UR-MCNC: 185.8 MG/DL
ERYTHROCYTE [DISTWIDTH] IN BLOOD BY AUTOMATED COUNT: 16.2 % (ref 11–15)
GLOBULIN PLAS-MCNC: 2.2 G/DL (ref 2.5–3.7)
GLUCOSE SERPL-MCNC: 140 MG/DL (ref 70–99)
GLUCOSE UR-MCNC: NEGATIVE MG/DL
HBA1C MFR BLD: 8 % (ref 4–6)
HCT VFR BLD AUTO: 42.5 % (ref 41–52)
HDLC SERPL-MCNC: 36 MG/DL
HGB BLD-MCNC: 14.2 G/DL (ref 13.5–17.5)
HYALINE CASTS #/AREA URNS AUTO: 1 /LPF
KETONES UR-MCNC: NEGATIVE MG/DL
LDLC SERPL CALC-MCNC: 58 MG/DL (ref 0–99)
LEUKOCYTE ESTERASE UR QL STRIP.AUTO: NEGATIVE
MCH RBC QN AUTO: 26 PG (ref 27–32)
MCHC RBC AUTO-ENTMCNC: 33.5 G/DL (ref 32–37)
MCV RBC AUTO: 77.7 FL (ref 80–100)
MICROALBUMIN UR-MCNC: 0.6 MG/DL (ref 0–1.8)
MICROALBUMIN/CREAT UR: 3.2 MG/G{CREAT} (ref 0–20)
NITRITE UR QL STRIP.AUTO: NEGATIVE
NONHDLC SERPL-MCNC: 86 MG/DL
OSMOLALITY UR CALC.SUM OF ELEC: 292 MOSM/KG (ref 275–295)
PATIENT FASTING: YES
PH UR: 5 [PH] (ref 5–8)
PLATELET # BLD AUTO: 143 K/UL (ref 140–400)
PMV BLD AUTO: 9.6 FL (ref 7.4–10.3)
POTASSIUM SERPL-SCNC: 3.7 MMOL/L (ref 3.3–5.1)
PROT SERPL-MCNC: 6.5 G/DL (ref 5.9–8.4)
PROT UR-MCNC: NEGATIVE MG/DL
PSA SERPL-MCNC: 0.9 NG/ML (ref 0–4)
RBC # BLD AUTO: 5.47 M/UL (ref 4.5–5.9)
RBC #/AREA URNS AUTO: 1 /HPF
SODIUM SERPL-SCNC: 138 MMOL/L (ref 136–144)
SP GR UR STRIP: 1.03 (ref 1–1.03)
TRIGL SERPL-MCNC: 140 MG/DL (ref 1–149)
UROBILINOGEN UR STRIP-ACNC: <2
VIT C UR-MCNC: NEGATIVE MG/DL
WBC # BLD AUTO: 6.7 K/UL (ref 4–11)
WBC #/AREA URNS AUTO: <1 /HPF

## 2018-08-10 PROCEDURE — 36415 COLL VENOUS BLD VENIPUNCTURE: CPT

## 2018-08-10 PROCEDURE — 83036 HEMOGLOBIN GLYCOSYLATED A1C: CPT

## 2018-08-10 PROCEDURE — 82570 ASSAY OF URINE CREATININE: CPT

## 2018-08-10 PROCEDURE — 80061 LIPID PANEL: CPT

## 2018-08-10 PROCEDURE — 80053 COMPREHEN METABOLIC PANEL: CPT

## 2018-08-10 PROCEDURE — 82043 UR ALBUMIN QUANTITATIVE: CPT

## 2018-08-10 PROCEDURE — 81001 URINALYSIS AUTO W/SCOPE: CPT

## 2018-08-10 PROCEDURE — 86803 HEPATITIS C AB TEST: CPT

## 2018-08-10 PROCEDURE — 85027 COMPLETE CBC AUTOMATED: CPT

## 2018-08-13 LAB — HCV AB SERPL QL IA: NONREACTIVE

## 2018-08-15 ENCOUNTER — OFFICE VISIT (OUTPATIENT)
Dept: FAMILY MEDICINE CLINIC | Facility: CLINIC | Age: 68
End: 2018-08-15
Payer: MEDICARE

## 2018-08-15 VITALS
SYSTOLIC BLOOD PRESSURE: 120 MMHG | WEIGHT: 182 LBS | OXYGEN SATURATION: 98 % | BODY MASS INDEX: 27 KG/M2 | HEART RATE: 68 BPM | DIASTOLIC BLOOD PRESSURE: 62 MMHG | RESPIRATION RATE: 18 BRPM

## 2018-08-15 DIAGNOSIS — IMO0001 UNCONTROLLED TYPE 2 DIABETES MELLITUS WITHOUT COMPLICATION, WITHOUT LONG-TERM CURRENT USE OF INSULIN: Primary | ICD-10-CM

## 2018-08-15 DIAGNOSIS — E78.2 MIXED HYPERLIPIDEMIA: ICD-10-CM

## 2018-08-15 PROCEDURE — 99213 OFFICE O/P EST LOW 20 MIN: CPT

## 2018-08-15 RX ORDER — PIOGLITAZONEHYDROCHLORIDE 15 MG/1
15 TABLET ORAL DAILY
Qty: 90 TABLET | Refills: 0 | Status: SHIPPED | OUTPATIENT
Start: 2018-08-15 | End: 2018-11-14

## 2018-08-15 RX ORDER — SIMVASTATIN 40 MG
40 TABLET ORAL NIGHTLY
Qty: 90 TABLET | Refills: 1 | Status: SHIPPED | OUTPATIENT
Start: 2018-08-15 | End: 2019-03-07

## 2018-08-16 PROBLEM — H25.13 NUCLEAR SCLEROTIC CATARACT OF BOTH EYES: Status: ACTIVE | Noted: 2017-03-03

## 2018-08-16 NOTE — PATIENT INSTRUCTIONS
Hiperglucemia (alto nivel de azúcar en la shruthi)     Si tiene hiperglucemia, juan alberto abundante cantidad de agua u otros líquidos sin azúcar ni cafeína. El exceso de glucosa (azúcar) en la shruthi se denomina “hiperglucemia” o “hiperglicemia”.  Un nivel de Para ayudar a evitar que sergio niveles de azúcar en la shruthi suban demasiado:  · Mantenga el estrés bajo control. · Si está enfermo, siga lal plan para los días de enfermedad.   · Cumpla con lal plan de comidas.  Coma solo la cantidad de comida que indica lal

## 2018-08-16 NOTE — PROGRESS NOTES
HPI:    Patient ID: Sully Cardenas is a 80year old male. Diabetes   He presents for his follow-up diabetic visit. He has type 2 diabetes mellitus. Onset time: few years. His disease course has been fluctuating.  There are no hypoglycemic associ headaches. All other systems reviewed and are negative. Current Outpatient Prescriptions:  MetFORMIN HCl 1000 MG Oral Tab Take 1 tablet (1,000 mg total) by mouth 2 (two) times daily with meals.  Disp: 180 tablet Rfl: 0   Pioglitazone HCl (ACTO answered. Lab results reviewed and discussed with pt. Latest Hgb A1c uncontrolled @ 8.0. Pt counseled with regards to diet and exercise. Pt restarted on Actos 15mg PO daily and refill for Metformin given.   Will obtain CMP and Hgb A1c prior to next sche

## 2018-09-18 ENCOUNTER — OFFICE VISIT (OUTPATIENT)
Dept: FAMILY MEDICINE CLINIC | Facility: CLINIC | Age: 68
End: 2018-09-18
Payer: MEDICARE

## 2018-09-18 VITALS
SYSTOLIC BLOOD PRESSURE: 120 MMHG | RESPIRATION RATE: 18 BRPM | OXYGEN SATURATION: 97 % | HEART RATE: 83 BPM | DIASTOLIC BLOOD PRESSURE: 70 MMHG | BODY MASS INDEX: 28 KG/M2 | WEIGHT: 188 LBS

## 2018-09-18 DIAGNOSIS — Z23 NEED FOR INFLUENZA VACCINATION: ICD-10-CM

## 2018-09-18 DIAGNOSIS — H00.11 CHALAZION OF RIGHT UPPER EYELID: Primary | ICD-10-CM

## 2018-09-18 PROCEDURE — 99213 OFFICE O/P EST LOW 20 MIN: CPT

## 2018-09-18 PROCEDURE — 90653 IIV ADJUVANT VACCINE IM: CPT

## 2018-09-18 PROCEDURE — G0008 ADMIN INFLUENZA VIRUS VAC: HCPCS

## 2018-09-18 RX ORDER — CIPROFLOXACIN HYDROCHLORIDE 3.5 MG/ML
2 SOLUTION/ DROPS TOPICAL EVERY 4 HOURS
Qty: 1 BOTTLE | Refills: 0 | Status: SHIPPED | OUTPATIENT
Start: 2018-09-18 | End: 2018-09-20

## 2018-09-18 NOTE — PATIENT INSTRUCTIONS
Chalazión [Chalazion]    Un chalazión es kirill glándula de aceite bloqueada o inflamada en el párpado. Existen pequeñas glándulas de aceite cerca de los párpados que lubrican el interior del párpado.  Si kirill glándula se bloquea, el aceite se acumula y hace · No use maquillaje en los ojos hasta que se haya curado el chalazión, o siga las indicaciones de lal proveedor de [employer].   · No use lentes de contacto hasta que se haya curado el chalazión, o siga las indicaciones de lal proveedor de [employer]

## 2018-09-18 NOTE — PROGRESS NOTES
HPI:    Patient ID: Jd Torres is a 80year old male. Eye Problem    The right (bump on his eyelid) eye is affected. This is a new problem. Episode onset: 10 days ago. The problem occurs constantly. The problem has been gradually worsening. HCl 0.4 MG Oral Cap Take 1 capsule (0.4 mg total) by mouth 2 (two) times daily.  Disp: 180 capsule Rfl: 3   PROCTO-MED HC 2.5 % Rectal Cream  Disp:  Rfl:    ELMO CONTOUR NEXT TEST In Vitro Strip TEST BID Disp:  Rfl: 3   MICROLET LANCETS Does not apply Misc

## 2018-09-20 ENCOUNTER — TELEPHONE (OUTPATIENT)
Dept: FAMILY MEDICINE CLINIC | Facility: CLINIC | Age: 68
End: 2018-09-20

## 2018-09-20 RX ORDER — POLYMYXIN B SULFATE AND TRIMETHOPRIM 1; 10000 MG/ML; [USP'U]/ML
1 SOLUTION OPHTHALMIC EVERY 4 HOURS
Qty: 1 BOTTLE | Refills: 0 | Status: SHIPPED | OUTPATIENT
Start: 2018-09-20 | End: 2018-09-27

## 2018-09-20 NOTE — TELEPHONE ENCOUNTER
[facility]co is calling in regards to the Ciprofloxacin eye drops. The medication is not being covered by the patient insurance. Calling to see what else the doctor would like to change it too.

## 2018-11-08 ENCOUNTER — NURSE ONLY (OUTPATIENT)
Dept: FAMILY MEDICINE CLINIC | Facility: CLINIC | Age: 68
End: 2018-11-08
Payer: MEDICARE

## 2018-11-08 DIAGNOSIS — IMO0001 UNCONTROLLED TYPE 2 DIABETES MELLITUS WITHOUT COMPLICATION, WITHOUT LONG-TERM CURRENT USE OF INSULIN: ICD-10-CM

## 2018-11-08 PROCEDURE — 80053 COMPREHEN METABOLIC PANEL: CPT

## 2018-11-08 PROCEDURE — 36415 COLL VENOUS BLD VENIPUNCTURE: CPT

## 2018-11-08 PROCEDURE — 83036 HEMOGLOBIN GLYCOSYLATED A1C: CPT

## 2018-11-14 ENCOUNTER — OFFICE VISIT (OUTPATIENT)
Dept: FAMILY MEDICINE CLINIC | Facility: CLINIC | Age: 68
End: 2018-11-14
Payer: MEDICARE

## 2018-11-14 VITALS
DIASTOLIC BLOOD PRESSURE: 70 MMHG | SYSTOLIC BLOOD PRESSURE: 132 MMHG | BODY MASS INDEX: 28.58 KG/M2 | OXYGEN SATURATION: 98 % | WEIGHT: 193 LBS | HEART RATE: 75 BPM | HEIGHT: 69 IN

## 2018-11-14 DIAGNOSIS — R14.0 ABDOMINAL DISTENSION (GASEOUS): ICD-10-CM

## 2018-11-14 DIAGNOSIS — E11.9 CONTROLLED TYPE 2 DIABETES MELLITUS WITHOUT COMPLICATION, WITHOUT LONG-TERM CURRENT USE OF INSULIN (HCC): Primary | ICD-10-CM

## 2018-11-14 PROCEDURE — 99214 OFFICE O/P EST MOD 30 MIN: CPT

## 2018-11-14 RX ORDER — DICYCLOMINE HYDROCHLORIDE 10 MG/1
10 CAPSULE ORAL 4 TIMES DAILY PRN
Qty: 90 CAPSULE | Refills: 0 | Status: SHIPPED | OUTPATIENT
Start: 2018-11-14 | End: 2019-02-13

## 2018-11-14 RX ORDER — PIOGLITAZONEHYDROCHLORIDE 15 MG/1
15 TABLET ORAL DAILY
Qty: 90 TABLET | Refills: 0 | Status: SHIPPED | OUTPATIENT
Start: 2018-11-14 | End: 2019-02-13

## 2018-11-15 PROBLEM — Z11.59 NEED FOR HEPATITIS C SCREENING TEST: Status: RESOLVED | Noted: 2018-05-08 | Resolved: 2018-11-15

## 2018-11-15 PROBLEM — R14.0 ABDOMINAL DISTENSION (GASEOUS): Status: ACTIVE | Noted: 2018-11-15

## 2018-11-15 PROBLEM — H00.11 CHALAZION OF RIGHT UPPER EYELID: Status: RESOLVED | Noted: 2018-09-18 | Resolved: 2018-11-15

## 2018-11-15 PROBLEM — R09.82 POSTNASAL DRIP: Status: RESOLVED | Noted: 2018-06-18 | Resolved: 2018-11-15

## 2018-11-15 NOTE — PROGRESS NOTES
HPI:    Patient ID: Sully Cardenas is a 80year old male. Diabetes   He presents for his follow-up diabetic visit. He has type 2 diabetes mellitus. Onset time: few years. His disease course has been improving.  There are no hypoglycemic associat for congestion, ear pain, rhinorrhea and sore throat. Eyes: Negative for blurred vision, photophobia, discharge and visual disturbance. Respiratory: Negative for cough and shortness of breath.     Cardiovascular: Negative for chest pain and palpitation Constitutional: He is oriented to person, place, and time. He appears well-developed and well-nourished. No distress. Cardiovascular: Normal rate, regular rhythm and normal heart sounds.    Pulmonary/Chest: Effort normal and breath sounds normal. No res

## 2018-11-15 NOTE — PATIENT INSTRUCTIONS
Exceso De Gas [Diet For Excess Gas]  Ciertas comidas producen gas cuando se digieren. En [employer], la cantidad de gas producida por estas comidas es excesiva.  [place] puede causar aventamiento, eructos o aumento de flato (aventación o pasar aire por

## 2019-02-08 ENCOUNTER — NURSE ONLY (OUTPATIENT)
Dept: FAMILY MEDICINE CLINIC | Facility: CLINIC | Age: 69
End: 2019-02-08
Payer: MEDICARE

## 2019-02-08 DIAGNOSIS — R58 BLOOD LOSS: Primary | ICD-10-CM

## 2019-02-08 DIAGNOSIS — E11.9 CONTROLLED TYPE 2 DIABETES MELLITUS WITHOUT COMPLICATION, WITHOUT LONG-TERM CURRENT USE OF INSULIN (HCC): ICD-10-CM

## 2019-02-08 LAB
ALBUMIN SERPL BCP-MCNC: 4.3 G/DL (ref 3.5–4.8)
ALBUMIN/GLOB SERPL: 2 {RATIO} (ref 1–2)
ALP SERPL-CCNC: 38 U/L (ref 32–100)
ALT SERPL-CCNC: 20 U/L (ref 17–63)
ANION GAP SERPL CALC-SCNC: 11 MMOL/L (ref 0–18)
AST SERPL-CCNC: 21 U/L (ref 15–41)
BILIRUB SERPL-MCNC: 1.1 MG/DL (ref 0.3–1.2)
BUN SERPL-MCNC: 19 MG/DL (ref 8–20)
BUN/CREAT SERPL: 20.2 (ref 10–20)
CALCIUM SERPL-MCNC: 9 MG/DL (ref 8.5–10.5)
CHLORIDE SERPL-SCNC: 106 MMOL/L (ref 95–110)
CO2 SERPL-SCNC: 20 MMOL/L (ref 22–32)
CREAT SERPL-MCNC: 0.94 MG/DL (ref 0.5–1.5)
CUVETTE LOT #: NORMAL NUMERIC
EST. AVERAGE GLUCOSE BLD GHB EST-MCNC: 166 MG/DL (ref 68–126)
GLOBULIN PLAS-MCNC: 2.2 G/DL (ref 2.5–3.7)
GLUCOSE SERPL-MCNC: 149 MG/DL (ref 70–99)
HBA1C MFR BLD HPLC: 7.4 % (ref ?–5.7)
HEMOGLOBIN: 14.2 G/DL (ref 13–17)
OSMOLALITY UR CALC.SUM OF ELEC: 289 MOSM/KG (ref 275–295)
PATIENT FASTING: YES
POTASSIUM SERPL-SCNC: 3.7 MMOL/L (ref 3.3–5.1)
PROT SERPL-MCNC: 6.5 G/DL (ref 5.9–8.4)
SODIUM SERPL-SCNC: 137 MMOL/L (ref 136–144)

## 2019-02-08 PROCEDURE — 83036 HEMOGLOBIN GLYCOSYLATED A1C: CPT

## 2019-02-08 PROCEDURE — 80053 COMPREHEN METABOLIC PANEL: CPT

## 2019-02-08 PROCEDURE — 36415 COLL VENOUS BLD VENIPUNCTURE: CPT

## 2019-02-08 PROCEDURE — 85018 HEMOGLOBIN: CPT

## 2019-02-08 NOTE — PROGRESS NOTES
A1C and CMP labs drawn per Dr Vangie Roth orders, HGB done due to finger laceration, pt tolerated lab draw well

## 2019-02-13 ENCOUNTER — OFFICE VISIT (OUTPATIENT)
Dept: FAMILY MEDICINE CLINIC | Facility: CLINIC | Age: 69
End: 2019-02-13
Payer: MEDICARE

## 2019-02-13 VITALS
SYSTOLIC BLOOD PRESSURE: 116 MMHG | OXYGEN SATURATION: 98 % | HEART RATE: 84 BPM | WEIGHT: 194 LBS | BODY MASS INDEX: 29 KG/M2 | DIASTOLIC BLOOD PRESSURE: 70 MMHG

## 2019-02-13 DIAGNOSIS — E78.2 MIXED HYPERLIPIDEMIA: ICD-10-CM

## 2019-02-13 DIAGNOSIS — Z12.5 ENCOUNTER FOR PROSTATE CANCER SCREENING: ICD-10-CM

## 2019-02-13 DIAGNOSIS — Z01.89 ENCOUNTER FOR ROUTINE LABORATORY TESTING: ICD-10-CM

## 2019-02-13 DIAGNOSIS — IMO0001 UNCONTROLLED TYPE 2 DIABETES MELLITUS WITHOUT COMPLICATION, WITHOUT LONG-TERM CURRENT USE OF INSULIN: Primary | ICD-10-CM

## 2019-02-13 DIAGNOSIS — R14.0 ABDOMINAL DISTENSION (GASEOUS): ICD-10-CM

## 2019-02-13 PROCEDURE — 99213 OFFICE O/P EST LOW 20 MIN: CPT

## 2019-02-13 RX ORDER — PIOGLITAZONEHYDROCHLORIDE 15 MG/1
15 TABLET ORAL DAILY
Qty: 90 TABLET | Refills: 0 | Status: SHIPPED | OUTPATIENT
Start: 2019-02-13 | End: 2019-05-10

## 2019-02-13 RX ORDER — DICYCLOMINE HYDROCHLORIDE 10 MG/1
10 CAPSULE ORAL 4 TIMES DAILY PRN
Qty: 90 CAPSULE | Refills: 0 | Status: SHIPPED | OUTPATIENT
Start: 2019-02-13 | End: 2019-11-11

## 2019-02-14 NOTE — PROGRESS NOTES
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 Diabetes   He presents for his follow-up diabetic visit. He has type 2 diabetes mellitus. Onset time: few years ago. His disease course has been fluctuating.  There are no hypoglycemic as Musculoskeletal: Negative for arthralgias, joint swelling, myalgias and neck pain. Skin: Negative for rash. Neurological: Negative for dizziness, vertigo, syncope, weakness, light-headedness, numbness and headaches.    All other systems reviewed and a answered. Lab results reviewed and discussed with pt. Latest Hgb A1c uncontrolled @ 7.4. Pt counseled with regards to diet and exercise. Refills for Metformin and Actos given.   Will refer to Dr. Melida Villar (Ophthalmology) for diabetic eye exam.  Will obt

## 2019-02-14 NOTE — PATIENT INSTRUCTIONS
Complicaciones de la diabetes a barbie plazo    Con el tiempo, la diabetes puede causar problemas de [place]. A esto se le llama “complicaciones”.  Es más probable que estos problemas ocurran si el nivel de azúcar en la shruthi sobrepasa los límites adecuados La mayoría de la gente con diabetes puede evitar las consecuencias serias de estas complicaciones si manejan kedar sergio niveles de glucosa en la [place], de presión arterial y de colesterol. [place] puede ayudarle a sentirse mejor y permanecer saludable.  Puede m

## 2019-03-07 DIAGNOSIS — E78.2 MIXED HYPERLIPIDEMIA: ICD-10-CM

## 2019-03-07 RX ORDER — SIMVASTATIN 40 MG
40 TABLET ORAL NIGHTLY
Qty: 90 TABLET | Refills: 1 | Status: SHIPPED | OUTPATIENT
Start: 2019-03-07 | End: 2019-05-10

## 2019-03-18 ENCOUNTER — OFFICE VISIT (OUTPATIENT)
Dept: FAMILY MEDICINE CLINIC | Facility: CLINIC | Age: 69
End: 2019-03-18
Payer: MEDICARE

## 2019-03-18 DIAGNOSIS — R09.82 POSTNASAL DRIP: Primary | ICD-10-CM

## 2019-03-18 PROCEDURE — 99213 OFFICE O/P EST LOW 20 MIN: CPT

## 2019-03-18 RX ORDER — FLUTICASONE PROPIONATE 50 MCG
2 SPRAY, SUSPENSION (ML) NASAL DAILY
Qty: 1 BOTTLE | Refills: 0 | Status: SHIPPED | OUTPATIENT
Start: 2019-03-18 | End: 2019-05-10 | Stop reason: ALTCHOICE

## 2019-03-18 RX ORDER — PROMETHAZINE HYDROCHLORIDE AND CODEINE PHOSPHATE 6.25; 1 MG/5ML; MG/5ML
5 SYRUP ORAL EVERY 6 HOURS PRN
Qty: 240 ML | Refills: 0 | Status: SHIPPED | OUTPATIENT
Start: 2019-03-18 | End: 2019-05-10 | Stop reason: ALTCHOICE

## 2019-03-18 NOTE — PROGRESS NOTES
HPI:    Patient ID: Greg Beltran is a 80year old male. Cough   This is a new problem. Episode onset: 5 days ago. The problem has been gradually improving. The problem occurs every few minutes. The cough is non-productive.  Associated symptoms Dicyclomine HCl (BENTYL) 10 MG Oral Cap Take 1 capsule (10 mg total) by mouth 4 (four) times daily as needed. Disp: 90 capsule Rfl: 0   MetFORMIN HCl 1000 MG Oral Tab Take 1 tablet (1,000 mg total) by mouth 2 (two) times daily with meals.  Disp: 180 table treatment options of disease process discussed with pt. Keep well hydrated. PO intake as tolerated. Tylenol/Motrin for pain/fever relief. Rx for Flonase nasal spray and Promethazine/Codeine given. RTC if symptoms worsen or fail to improve.       No ord

## 2019-03-23 VITALS
OXYGEN SATURATION: 97 % | TEMPERATURE: 98 F | BODY MASS INDEX: 29 KG/M2 | DIASTOLIC BLOOD PRESSURE: 74 MMHG | HEART RATE: 95 BPM | WEIGHT: 196 LBS | SYSTOLIC BLOOD PRESSURE: 132 MMHG

## 2019-04-07 DIAGNOSIS — R09.82 POSTNASAL DRIP: ICD-10-CM

## 2019-04-08 RX ORDER — PROMETHAZINE HYDROCHLORIDE AND CODEINE PHOSPHATE 6.25; 1 MG/5ML; MG/5ML
SOLUTION ORAL
Qty: 240 ML | Refills: 0 | OUTPATIENT
Start: 2019-04-08

## 2019-05-07 ENCOUNTER — NURSE ONLY (OUTPATIENT)
Dept: FAMILY MEDICINE CLINIC | Facility: CLINIC | Age: 69
End: 2019-05-07
Payer: MEDICARE

## 2019-05-07 DIAGNOSIS — Z12.5 ENCOUNTER FOR PROSTATE CANCER SCREENING: ICD-10-CM

## 2019-05-07 DIAGNOSIS — E78.2 MIXED HYPERLIPIDEMIA: ICD-10-CM

## 2019-05-07 DIAGNOSIS — Z01.89 ENCOUNTER FOR ROUTINE LABORATORY TESTING: ICD-10-CM

## 2019-05-07 DIAGNOSIS — IMO0001 UNCONTROLLED TYPE 2 DIABETES MELLITUS WITHOUT COMPLICATION, WITHOUT LONG-TERM CURRENT USE OF INSULIN: ICD-10-CM

## 2019-05-07 PROCEDURE — 80053 COMPREHEN METABOLIC PANEL: CPT

## 2019-05-07 PROCEDURE — 81003 URINALYSIS AUTO W/O SCOPE: CPT

## 2019-05-07 PROCEDURE — 80061 LIPID PANEL: CPT

## 2019-05-07 PROCEDURE — 36415 COLL VENOUS BLD VENIPUNCTURE: CPT

## 2019-05-07 PROCEDURE — 82570 ASSAY OF URINE CREATININE: CPT

## 2019-05-07 PROCEDURE — 82043 UR ALBUMIN QUANTITATIVE: CPT

## 2019-05-07 PROCEDURE — 85027 COMPLETE CBC AUTOMATED: CPT

## 2019-05-07 PROCEDURE — 83036 HEMOGLOBIN GLYCOSYLATED A1C: CPT

## 2019-05-10 ENCOUNTER — OFFICE VISIT (OUTPATIENT)
Dept: FAMILY MEDICINE CLINIC | Facility: CLINIC | Age: 69
End: 2019-05-10
Payer: MEDICARE

## 2019-05-10 VITALS
BODY MASS INDEX: 28.26 KG/M2 | HEIGHT: 69.5 IN | SYSTOLIC BLOOD PRESSURE: 128 MMHG | WEIGHT: 195.19 LBS | DIASTOLIC BLOOD PRESSURE: 72 MMHG | HEART RATE: 78 BPM | RESPIRATION RATE: 18 BRPM | OXYGEN SATURATION: 98 %

## 2019-05-10 DIAGNOSIS — H25.13 NUCLEAR SCLEROTIC CATARACT OF BOTH EYES: ICD-10-CM

## 2019-05-10 DIAGNOSIS — E66.3 OVERWEIGHT (BMI 25.0-29.9): ICD-10-CM

## 2019-05-10 DIAGNOSIS — K64.4 EXTERNAL HEMORRHOIDS: ICD-10-CM

## 2019-05-10 DIAGNOSIS — D17.1 LIPOMA OF ABDOMINAL WALL: ICD-10-CM

## 2019-05-10 DIAGNOSIS — R14.0 ABDOMINAL DISTENSION (GASEOUS): ICD-10-CM

## 2019-05-10 DIAGNOSIS — K57.30 DIVERTICULOSIS OF LARGE INTESTINE WITHOUT HEMORRHAGE: ICD-10-CM

## 2019-05-10 DIAGNOSIS — I70.0 AORTIC ATHEROSCLEROSIS (HCC): ICD-10-CM

## 2019-05-10 DIAGNOSIS — Z00.00 ENCOUNTER FOR MEDICARE ANNUAL WELLNESS EXAM: Primary | ICD-10-CM

## 2019-05-10 DIAGNOSIS — Z87.891 FORMER SMOKER: ICD-10-CM

## 2019-05-10 DIAGNOSIS — K21.9 GASTROESOPHAGEAL REFLUX DISEASE WITHOUT ESOPHAGITIS: ICD-10-CM

## 2019-05-10 DIAGNOSIS — R97.20 BPH WITH ELEVATED PSA: ICD-10-CM

## 2019-05-10 DIAGNOSIS — IMO0001 UNCONTROLLED TYPE 2 DIABETES MELLITUS WITHOUT COMPLICATION, WITHOUT LONG-TERM CURRENT USE OF INSULIN: ICD-10-CM

## 2019-05-10 DIAGNOSIS — H52.4 PRESBYOPIA: ICD-10-CM

## 2019-05-10 DIAGNOSIS — R35.1 BPH ASSOCIATED WITH NOCTURIA: ICD-10-CM

## 2019-05-10 DIAGNOSIS — M15.9 PRIMARY OSTEOARTHRITIS INVOLVING MULTIPLE JOINTS: ICD-10-CM

## 2019-05-10 DIAGNOSIS — Z13.31 DEPRESSION SCREENING: ICD-10-CM

## 2019-05-10 DIAGNOSIS — E78.2 MIXED HYPERLIPIDEMIA: ICD-10-CM

## 2019-05-10 DIAGNOSIS — N40.0 BPH WITH ELEVATED PSA: ICD-10-CM

## 2019-05-10 DIAGNOSIS — N40.1 BPH ASSOCIATED WITH NOCTURIA: ICD-10-CM

## 2019-05-10 DIAGNOSIS — D22.9 MELANOCYTIC NEVUS, UNSPECIFIED LOCATION: ICD-10-CM

## 2019-05-10 PROCEDURE — G0439 PPPS, SUBSEQ VISIT: HCPCS

## 2019-05-10 PROCEDURE — 99213 OFFICE O/P EST LOW 20 MIN: CPT

## 2019-05-10 RX ORDER — PIOGLITAZONEHYDROCHLORIDE 15 MG/1
15 TABLET ORAL DAILY
Qty: 90 TABLET | Refills: 0 | Status: SHIPPED | OUTPATIENT
Start: 2019-05-10 | End: 2019-08-09

## 2019-05-10 RX ORDER — TAMSULOSIN HYDROCHLORIDE 0.4 MG/1
0.4 CAPSULE ORAL 2 TIMES DAILY
Qty: 180 CAPSULE | Refills: 3 | Status: SHIPPED | OUTPATIENT
Start: 2019-05-10 | End: 2020-02-28

## 2019-05-10 RX ORDER — FINASTERIDE 5 MG/1
5 TABLET, FILM COATED ORAL DAILY
Qty: 90 TABLET | Refills: 3 | Status: SHIPPED | OUTPATIENT
Start: 2019-05-10 | End: 2020-01-14

## 2019-05-10 RX ORDER — SIMVASTATIN 40 MG
40 TABLET ORAL NIGHTLY
Qty: 90 TABLET | Refills: 3 | Status: SHIPPED | OUTPATIENT
Start: 2019-05-10 | End: 2020-07-20

## 2019-05-11 PROBLEM — I70.0 AORTIC ATHEROSCLEROSIS (HCC): Status: ACTIVE | Noted: 2017-06-03

## 2019-05-11 PROBLEM — R09.82 POSTNASAL DRIP: Status: RESOLVED | Noted: 2018-06-18 | Resolved: 2019-05-11

## 2019-05-11 PROBLEM — K21.9 GASTROESOPHAGEAL REFLUX DISEASE WITHOUT ESOPHAGITIS: Status: ACTIVE | Noted: 2018-04-24

## 2019-05-11 RX ORDER — LANCETS
EACH MISCELLANEOUS
Qty: 100 EACH | Refills: 5 | COMMUNITY
Start: 2017-02-21

## 2019-05-11 RX ORDER — PANTOPRAZOLE SODIUM 40 MG/1
40 TABLET, DELAYED RELEASE ORAL
Qty: 30 TABLET | Refills: 0 | COMMUNITY
Start: 2018-07-06 | End: 2019-11-11 | Stop reason: ALTCHOICE

## 2019-05-11 NOTE — PROGRESS NOTES
HPI:   Martinez Cool is a 77year old male who presents for a MA (Medicare Advantage) Supervisit (Once per calendar year). Pt denies any acute complaints at this time.          Fall/Risk Assessment   He has been screened for Falls and is low ris MSSP    Patient Active Problem List:     Uncontrolled type 2 diabetes mellitus without complication, without long-term current use of insulin (HCC)     Overweight (BMI 25.0-29. 9)     External hemorrhoids     Primary osteoarthritis involving multiple joints daily with meals. simvastatin 40 MG Oral Tab Take 1 tablet (40 mg total) by mouth nightly. finasteride 5 MG Oral Tab Take 1 tablet (5 mg total) by mouth daily. tamsulosin HCl 0.4 MG Oral Cap Take 1 capsule (0.4 mg total) by mouth 2 (two) times daily. 3.2 oz   SpO2 98%   BMI 28.41 kg/m²   Estimated body mass index is 28.41 kg/m² as calculated from the following:    Height as of this encounter: 69.5\". Weight as of this encounter: 195 lb 3.2 oz.     Medicare Hearing Assessment  (Required for AWV/SWV) TTP mobile mass on R flank area, multiple melanocytic nevi throughout his body   Lymph nodes: Cervical, supraclavicular, and axillary nodes normal   Neurologic: Normal, bilateral monofilament/sensation of both feet is normal            Vaccination History (gaseous)  -     Continue Bentyl PRN as previously instructed. External hemorrhoids  Diverticulosis of large intestine without hemorrhage  -     Pt counseled with regards to increased water and high fiber diet intake.     Nuclear sclerotic cataract of starla 2 - No  Has your appetite been poor?: No  How does the patient maintain a good energy level?: Appropriate Exercise  How would you describe your daily physical activity?: Moderate  How would you describe your current health state?: Fair  How do you maintain factors:   End-stage renal disease   Hemophiliacs who received Factor VIII or IX concentrates   Clients of institutions for the mentally retarded   Persons who live in the same house as a HepB virus carrier   Homosexual men   Illicit injectable drug abuser

## 2019-05-11 NOTE — PATIENT INSTRUCTIONS
[address] SCREENING SCHEDULE   Tests on this list are recommended by your physician but may not be covered, or covered at this frequency, by your insurer. Please check with your insurance carrier before scheduling to verify coverage.     P 10 years- more often if abnormal Colonoscopy due on 04/24/2028 Update [facility] if applicable    Flex Sigmoidoscopy Screen  Covered every 5 years No results found for this or any previous visit. No flowsheet data found.      Fecal Occult Blood   [place] Medicare Part B) No orders found for this or any previous visit.  This may be covered with your prescription benefits, but Medicare does not cover unless Medically needed    Zoster (Not covered by Medicare Part B) No orders found for this or any previous vi

## 2019-05-27 PROBLEM — Z13.31 DEPRESSION SCREENING: Status: RESOLVED | Noted: 2017-05-03 | Resolved: 2019-05-27

## 2019-05-27 PROBLEM — Z00.00 ENCOUNTER FOR MEDICARE ANNUAL WELLNESS EXAM: Status: RESOLVED | Noted: 2017-05-03 | Resolved: 2019-05-27

## 2019-07-30 ENCOUNTER — TELEPHONE (OUTPATIENT)
Dept: FAMILY MEDICINE CLINIC | Facility: CLINIC | Age: 69
End: 2019-07-30

## 2019-07-30 ENCOUNTER — NURSE ONLY (OUTPATIENT)
Dept: FAMILY MEDICINE CLINIC | Facility: CLINIC | Age: 69
End: 2019-07-30
Payer: MEDICARE

## 2019-07-30 DIAGNOSIS — IMO0001 UNCONTROLLED TYPE 2 DIABETES MELLITUS WITHOUT COMPLICATION, WITHOUT LONG-TERM CURRENT USE OF INSULIN: ICD-10-CM

## 2019-07-30 LAB
ALBUMIN SERPL-MCNC: 4.3 G/DL (ref 3.4–5)
ALBUMIN/GLOB SERPL: 1.5 {RATIO} (ref 1–2)
ALP LIVER SERPL-CCNC: 44 U/L (ref 45–117)
ALT SERPL-CCNC: 33 U/L (ref 16–61)
ANION GAP SERPL CALC-SCNC: 8 MMOL/L (ref 0–18)
AST SERPL-CCNC: 21 U/L (ref 15–37)
BILIRUB SERPL-MCNC: 0.7 MG/DL (ref 0.1–2)
BUN BLD-MCNC: 17 MG/DL (ref 7–18)
BUN/CREAT SERPL: 17 (ref 10–20)
CALCIUM BLD-MCNC: 9 MG/DL (ref 8.5–10.1)
CHLORIDE SERPL-SCNC: 107 MMOL/L (ref 98–112)
CO2 SERPL-SCNC: 25 MMOL/L (ref 21–32)
CREAT BLD-MCNC: 1 MG/DL (ref 0.7–1.3)
EST. AVERAGE GLUCOSE BLD GHB EST-MCNC: 154 MG/DL (ref 68–126)
GLOBULIN PLAS-MCNC: 2.8 G/DL (ref 2.8–4.4)
GLUCOSE BLD-MCNC: 121 MG/DL (ref 70–99)
HBA1C MFR BLD HPLC: 7 % (ref ?–5.7)
M PROTEIN MFR SERPL ELPH: 7.1 G/DL (ref 6.4–8.2)
OSMOLALITY SERPL CALC.SUM OF ELEC: 293 MOSM/KG (ref 275–295)
PATIENT FASTING: YES
POTASSIUM SERPL-SCNC: 4 MMOL/L (ref 3.5–5.1)
SODIUM SERPL-SCNC: 140 MMOL/L (ref 136–145)

## 2019-07-30 PROCEDURE — 80053 COMPREHEN METABOLIC PANEL: CPT

## 2019-07-30 PROCEDURE — 83036 HEMOGLOBIN GLYCOSYLATED A1C: CPT

## 2019-07-30 PROCEDURE — 36415 COLL VENOUS BLD VENIPUNCTURE: CPT

## 2019-07-30 NOTE — TELEPHONE ENCOUNTER
Lydia from the [facility] called stating that the pt walked into the pharmacy today requesting the Contour Next Glucose Monitoring Machine, Strips, & Lancelets.  Pharmacist states that pt has never ordered this before, wants to know if its ok with

## 2019-08-01 ENCOUNTER — TELEPHONE (OUTPATIENT)
Dept: FAMILY MEDICINE CLINIC | Facility: CLINIC | Age: 69
End: 2019-08-01

## 2019-08-01 NOTE — TELEPHONE ENCOUNTER
Pharmacy faxed form with the need to dictate the following for Medicare compliance on the prescription:  Name of strips (Contour Next); ICD 10 diagnosis code; and if patient is insulin-dependent or not.     Re-sending strips RX to pharmacy with info request

## 2019-08-09 ENCOUNTER — OFFICE VISIT (OUTPATIENT)
Dept: FAMILY MEDICINE CLINIC | Facility: CLINIC | Age: 69
End: 2019-08-09
Payer: MEDICARE

## 2019-08-09 VITALS
SYSTOLIC BLOOD PRESSURE: 120 MMHG | HEIGHT: 69.5 IN | OXYGEN SATURATION: 99 % | WEIGHT: 197 LBS | BODY MASS INDEX: 28.52 KG/M2 | HEART RATE: 85 BPM | DIASTOLIC BLOOD PRESSURE: 70 MMHG

## 2019-08-09 DIAGNOSIS — E11.9 CONTROLLED TYPE 2 DIABETES MELLITUS WITHOUT COMPLICATION, WITHOUT LONG-TERM CURRENT USE OF INSULIN (HCC): Primary | ICD-10-CM

## 2019-08-09 PROCEDURE — 99213 OFFICE O/P EST LOW 20 MIN: CPT

## 2019-08-09 RX ORDER — PIOGLITAZONEHYDROCHLORIDE 15 MG/1
15 TABLET ORAL DAILY
Qty: 90 TABLET | Refills: 0 | Status: SHIPPED | OUTPATIENT
Start: 2019-08-09 | End: 2020-10-20

## 2019-08-09 NOTE — PROGRESS NOTES
HPI:    Patient ID: Benita Coyle is a 77year old male. Diabetes   He presents for his follow-up diabetic visit. He has type 2 diabetes mellitus. Onset time: few years ago. His disease course has been fluctuating.  There are no hypoglycemic as myalgias and neck pain. Skin: Negative for rash. Neurological: Negative for dizziness, syncope, weakness, light-headedness, numbness and headaches. All other systems reviewed and are negative.              Current Outpatient Medications:  metFORMIN HC and discussed with pt. Latest Hgb A1c uncontrolled @ 7.0. Pt counseled with regards to diet and exercise. Refills for Metformin and Actos given. Will obtain CMP and Hgb A1c prior to next scheduled appointment. RTC in 3 months for f/u of DM.       Order

## 2019-08-09 NOTE — PATIENT INSTRUCTIONS
Manejo de la diabetes: la prueba de A1C      Los glóbulos rojos sanos tienen un poco de Wilber. Un nivel alto de A1C significa que hay demasiada glucosa adherida a los glóbulos rojos. ¿Qué es la prueba de A1C?   El [place] de lal medidor de glucosa La mayoría de las personas tiene silvia objetivo un nivel de A1C inferior al 7%. Es decir, un valor de eAG por debajo de 154 mg/dL.  También es posible que lal proveedor de atención médica desee que usted apunte a un nivel de A1C del 6%, lo que representa un v

## 2019-11-04 ENCOUNTER — NURSE ONLY (OUTPATIENT)
Dept: FAMILY MEDICINE CLINIC | Facility: CLINIC | Age: 69
End: 2019-11-04
Payer: MEDICARE

## 2019-11-04 DIAGNOSIS — E11.9 CONTROLLED TYPE 2 DIABETES MELLITUS WITHOUT COMPLICATION, WITHOUT LONG-TERM CURRENT USE OF INSULIN (HCC): ICD-10-CM

## 2019-11-04 PROCEDURE — 36415 COLL VENOUS BLD VENIPUNCTURE: CPT

## 2019-11-04 PROCEDURE — 83036 HEMOGLOBIN GLYCOSYLATED A1C: CPT

## 2019-11-04 PROCEDURE — 80053 COMPREHEN METABOLIC PANEL: CPT

## 2019-11-04 NOTE — PROGRESS NOTES
Patient came in today to get labs done, L antecubital was used to collect blood without complications. Specimen sent to the lab.

## 2019-11-11 ENCOUNTER — OFFICE VISIT (OUTPATIENT)
Dept: FAMILY MEDICINE CLINIC | Facility: CLINIC | Age: 69
End: 2019-11-11
Payer: MEDICARE

## 2019-11-11 VITALS
DIASTOLIC BLOOD PRESSURE: 70 MMHG | SYSTOLIC BLOOD PRESSURE: 108 MMHG | HEART RATE: 82 BPM | OXYGEN SATURATION: 95 % | BODY MASS INDEX: 28 KG/M2 | WEIGHT: 189 LBS

## 2019-11-11 DIAGNOSIS — Z12.5 ENCOUNTER FOR PROSTATE CANCER SCREENING: ICD-10-CM

## 2019-11-11 DIAGNOSIS — Z01.89 ENCOUNTER FOR ROUTINE LABORATORY TESTING: ICD-10-CM

## 2019-11-11 DIAGNOSIS — R14.0 ABDOMINAL DISTENSION (GASEOUS): ICD-10-CM

## 2019-11-11 DIAGNOSIS — E11.9 CONTROLLED TYPE 2 DIABETES MELLITUS WITHOUT COMPLICATION, WITHOUT LONG-TERM CURRENT USE OF INSULIN (HCC): Primary | ICD-10-CM

## 2019-11-11 DIAGNOSIS — Z13.0 SCREENING FOR IRON DEFICIENCY ANEMIA: ICD-10-CM

## 2019-11-11 DIAGNOSIS — E78.2 MIXED HYPERLIPIDEMIA: ICD-10-CM

## 2019-11-11 PROCEDURE — 99214 OFFICE O/P EST MOD 30 MIN: CPT

## 2019-11-11 RX ORDER — PIOGLITAZONEHYDROCHLORIDE 15 MG/1
15 TABLET ORAL DAILY
Qty: 90 TABLET | Refills: 1 | Status: SHIPPED | OUTPATIENT
Start: 2019-11-11 | End: 2020-07-20

## 2019-11-11 RX ORDER — DICYCLOMINE HCL 20 MG
20 TABLET ORAL 4 TIMES DAILY PRN
Qty: 90 TABLET | Refills: 0 | Status: SHIPPED | OUTPATIENT
Start: 2019-11-11 | End: 2020-07-20 | Stop reason: ALTCHOICE

## 2019-11-11 NOTE — PROGRESS NOTES
HPI:    Patient ID: Jennifer Schaefer is a 77year old male. Diabetes   He presents for his follow-up diabetic visit. He has type 2 diabetes mellitus. Onset time: few years ago. His disease course has been stable.  There are no hypoglycemic associa unexpected weight change and weight loss. HENT: Negative for congestion, ear pain, rhinorrhea and sore throat. Eyes: Negative for blurred vision, photophobia, discharge and visual disturbance. Respiratory: Negative for cough and shortness of breath. appears well-developed and well-nourished. No distress. Cardiovascular: Normal rate, regular rhythm and normal heart sounds. Pulmonary/Chest: Effort normal and breath sounds normal. No respiratory distress. Abdominal: Soft.  Bowel sounds are normal. H Visit:  Requested Prescriptions     Signed Prescriptions Disp Refills   • Pioglitazone HCl (ACTOS) 15 MG Oral Tab 90 tablet 1     Sig: Take 1 tablet (15 mg total) by mouth daily.    • metFORMIN HCl 1000 MG Oral Tab 180 tablet 1     Sig: Take 1 tablet (1,000

## 2019-11-11 NOTE — PATIENT INSTRUCTIONS
Dieta: Diabetes [Diabetic Diet]  Los alimentos son Jarome Venkat herramienta importante que puede utilizar para controlar la diabetes y [place] mackenzie.  [place] alimentos kedar equilibrados en las cantidades [employer] ayudará a controlar la concentración de azúcar · Coma menos grasa para ayudar a reducir lla riesgo de enfermedad del corazón. Consuma productos lácteos sin grasa o con bajo contenido graso y Evelyne Vidal. Evite los alimentos fritos. Use aceites de cocina no saturados.   · Consulte a lal nutricionista sob

## 2019-12-16 ENCOUNTER — TELEPHONE (OUTPATIENT)
Dept: FAMILY MEDICINE CLINIC | Facility: CLINIC | Age: 69
End: 2019-12-16

## 2019-12-16 NOTE — TELEPHONE ENCOUNTER
Refill was authorized by Dr Elvis Shepherd since 11/11/19 but [facility] claims they never received it, a verbal order was authorized via phone under Dr Kristyn dominugez.

## 2019-12-16 NOTE — TELEPHONE ENCOUNTER
Patient is calling asking for a refill on his metformin. Called pharmacy and they state they do not have any medication from 11/11/2019. Please re-send to pharmacy and let patient know.

## 2020-01-14 ENCOUNTER — TELEPHONE (OUTPATIENT)
Dept: FAMILY MEDICINE CLINIC | Facility: CLINIC | Age: 70
End: 2020-01-14

## 2020-01-14 DIAGNOSIS — N40.0 BPH WITH ELEVATED PSA: ICD-10-CM

## 2020-01-14 DIAGNOSIS — N40.1 BPH ASSOCIATED WITH NOCTURIA: ICD-10-CM

## 2020-01-14 DIAGNOSIS — R35.1 BPH ASSOCIATED WITH NOCTURIA: ICD-10-CM

## 2020-01-14 DIAGNOSIS — R97.20 BPH WITH ELEVATED PSA: ICD-10-CM

## 2020-01-14 RX ORDER — FINASTERIDE 5 MG/1
5 TABLET, FILM COATED ORAL DAILY
Qty: 90 TABLET | Refills: 0 | Status: SHIPPED | OUTPATIENT
Start: 2020-01-14 | End: 2020-07-20

## 2020-02-28 RX ORDER — TAMSULOSIN HYDROCHLORIDE 0.4 MG/1
0.4 CAPSULE ORAL 2 TIMES DAILY
Qty: 180 CAPSULE | Refills: 0 | Status: SHIPPED | OUTPATIENT
Start: 2020-02-28 | End: 2020-10-20

## 2020-02-28 NOTE — TELEPHONE ENCOUNTER
Refill requested for Tamsulosin was approved by Dr Talon Acosta and now switched to Dr Belkis Rogers name, meds were e-Rx to [facility].

## 2020-06-11 ENCOUNTER — TELEPHONE (OUTPATIENT)
Dept: FAMILY MEDICINE CLINIC | Facility: CLINIC | Age: 70
End: 2020-06-11

## 2020-06-15 ENCOUNTER — VIRTUAL PHONE E/M (OUTPATIENT)
Dept: FAMILY MEDICINE CLINIC | Facility: CLINIC | Age: 70
End: 2020-06-15
Payer: MEDICARE

## 2020-06-15 DIAGNOSIS — J30.2 SEASONAL ALLERGIC RHINITIS, UNSPECIFIED TRIGGER: Primary | ICD-10-CM

## 2020-06-15 PROCEDURE — 99441 PHONE E/M BY PHYS 5-10 MIN: CPT | Performed by: FAMILY MEDICINE

## 2020-06-15 RX ORDER — LORATADINE 10 MG/1
10 TABLET ORAL DAILY
Qty: 30 TABLET | Refills: 2 | Status: SHIPPED | OUTPATIENT
Start: 2020-06-15 | End: 2020-10-20 | Stop reason: ALTCHOICE

## 2020-06-15 RX ORDER — FLUTICASONE PROPIONATE 50 MCG
2 SPRAY, SUSPENSION (ML) NASAL DAILY
Qty: 1 BOTTLE | Refills: 1 | Status: SHIPPED | OUTPATIENT
Start: 2020-06-15 | End: 2020-08-31

## 2020-06-15 NOTE — PATIENT INSTRUCTIONS
Rinitis alérgica  La rinitis alérgica es kirill reacción alérgica que afecta la nariz y, con frecuencia, los ojos. Se conocen silvia alergias nasales. A menudo, las alergias nasales se deben a elementos que están presentes en el ambiente y se respiran.  Depend · Cubra el colchón, el somier y las almohadas con fundas antialérgicas.   · De ser posible, duerma en un cuarto que no tenga tapete, jennie ni muebles tapizados. Vanessa:  · Guarde la comida en recipientes cerrados herméticamente.   · Saque enseguida © 6050-6328 [address]. [facility] [facility], [facility]. Todos los derechos reservados. Esta información no pretende sustituir la atención médica profesional. Sólo lal médico puede diagnosticar y tratar un problema de chio.

## 2020-06-15 NOTE — PROGRESS NOTES
Virtual Telephone Check-In    Rea Chris verbally consents to a Virtual/Telephone Check-In visit on 06/15/20. Patient has been referred to the [facility] website at [email or web address] to review the yearly Consent to Treat document.     Patient

## 2020-06-24 DIAGNOSIS — E11.9 CONTROLLED TYPE 2 DIABETES MELLITUS WITHOUT COMPLICATION, WITHOUT LONG-TERM CURRENT USE OF INSULIN (HCC): ICD-10-CM

## 2020-07-16 ENCOUNTER — NURSE ONLY (OUTPATIENT)
Dept: FAMILY MEDICINE CLINIC | Facility: CLINIC | Age: 70
End: 2020-07-16
Payer: MEDICARE

## 2020-07-16 DIAGNOSIS — Z13.0 SCREENING FOR IRON DEFICIENCY ANEMIA: ICD-10-CM

## 2020-07-16 DIAGNOSIS — E78.2 MIXED HYPERLIPIDEMIA: ICD-10-CM

## 2020-07-16 DIAGNOSIS — Z12.5 ENCOUNTER FOR PROSTATE CANCER SCREENING: ICD-10-CM

## 2020-07-16 DIAGNOSIS — Z01.89 ENCOUNTER FOR ROUTINE LABORATORY TESTING: ICD-10-CM

## 2020-07-16 DIAGNOSIS — E11.9 CONTROLLED TYPE 2 DIABETES MELLITUS WITHOUT COMPLICATION, WITHOUT LONG-TERM CURRENT USE OF INSULIN (HCC): ICD-10-CM

## 2020-07-16 LAB
ALBUMIN SERPL-MCNC: 4.1 G/DL (ref 3.4–5)
ALBUMIN/GLOB SERPL: 1.5 {RATIO} (ref 1–2)
ALP LIVER SERPL-CCNC: 49 U/L (ref 45–117)
ALT SERPL-CCNC: 28 U/L (ref 16–61)
ANION GAP SERPL CALC-SCNC: 5 MMOL/L (ref 0–18)
AST SERPL-CCNC: 17 U/L (ref 15–37)
BILIRUB SERPL-MCNC: 0.6 MG/DL (ref 0.1–2)
BILIRUB UR QL: NEGATIVE
BUN BLD-MCNC: 15 MG/DL (ref 7–18)
BUN/CREAT SERPL: 15.3 (ref 10–20)
CALCIUM BLD-MCNC: 8.7 MG/DL (ref 8.5–10.1)
CHLORIDE SERPL-SCNC: 107 MMOL/L (ref 98–112)
CHOLEST SMN-MCNC: 167 MG/DL (ref ?–200)
CLARITY UR: CLEAR
CO2 SERPL-SCNC: 29 MMOL/L (ref 21–32)
COLOR UR: YELLOW
COMPLEXED PSA SERPL-MCNC: 2.21 NG/ML (ref ?–4)
CREAT BLD-MCNC: 0.98 MG/DL (ref 0.7–1.3)
CREAT UR-SCNC: 118 MG/DL
DEPRECATED RDW RBC AUTO: 42.4 FL (ref 35.1–46.3)
ERYTHROCYTE [DISTWIDTH] IN BLOOD BY AUTOMATED COUNT: 13 % (ref 11–15)
EST. AVERAGE GLUCOSE BLD GHB EST-MCNC: 128 MG/DL (ref 68–126)
GLOBULIN PLAS-MCNC: 2.7 G/DL (ref 2.8–4.4)
GLUCOSE BLD-MCNC: 126 MG/DL (ref 70–99)
GLUCOSE UR-MCNC: NEGATIVE MG/DL
HBA1C MFR BLD HPLC: 6.1 % (ref ?–5.7)
HCT VFR BLD AUTO: 40.5 % (ref 39–53)
HDLC SERPL-MCNC: 26 MG/DL (ref 40–59)
HGB BLD-MCNC: 13.1 G/DL (ref 13–17.5)
HGB UR QL STRIP.AUTO: NEGATIVE
KETONES UR-MCNC: NEGATIVE MG/DL
LDLC SERPL CALC-MCNC: 103 MG/DL (ref ?–100)
LEUKOCYTE ESTERASE UR QL STRIP.AUTO: NEGATIVE
M PROTEIN MFR SERPL ELPH: 6.8 G/DL (ref 6.4–8.2)
MCH RBC QN AUTO: 29.2 PG (ref 26–34)
MCHC RBC AUTO-ENTMCNC: 32.3 G/DL (ref 31–37)
MCV RBC AUTO: 90.2 FL (ref 80–100)
MICROALBUMIN UR-MCNC: 0.62 MG/DL
MICROALBUMIN/CREAT 24H UR-RTO: 5.3 UG/MG (ref ?–30)
NITRITE UR QL STRIP.AUTO: NEGATIVE
NONHDLC SERPL-MCNC: 141 MG/DL (ref ?–130)
OSMOLALITY SERPL CALC.SUM OF ELEC: 294 MOSM/KG (ref 275–295)
PATIENT FASTING Y/N/NP: YES
PATIENT FASTING Y/N/NP: YES
PH UR: 7 [PH] (ref 5–8)
PLATELET # BLD AUTO: 144 10(3)UL (ref 150–450)
POTASSIUM SERPL-SCNC: 4.7 MMOL/L (ref 3.5–5.1)
PROT UR-MCNC: NEGATIVE MG/DL
RBC # BLD AUTO: 4.49 X10(6)UL (ref 3.8–5.8)
SODIUM SERPL-SCNC: 141 MMOL/L (ref 136–145)
SP GR UR STRIP: 1.02 (ref 1–1.03)
TRIGL SERPL-MCNC: 189 MG/DL (ref 30–149)
UROBILINOGEN UR STRIP-ACNC: <2
VLDLC SERPL CALC-MCNC: 38 MG/DL (ref 0–30)
WBC # BLD AUTO: 4.4 X10(3) UL (ref 4–11)

## 2020-07-16 PROCEDURE — 81003 URINALYSIS AUTO W/O SCOPE: CPT

## 2020-07-16 PROCEDURE — 83036 HEMOGLOBIN GLYCOSYLATED A1C: CPT

## 2020-07-16 PROCEDURE — 36415 COLL VENOUS BLD VENIPUNCTURE: CPT | Performed by: INTERNAL MEDICINE

## 2020-07-16 PROCEDURE — 80061 LIPID PANEL: CPT

## 2020-07-16 PROCEDURE — 80053 COMPREHEN METABOLIC PANEL: CPT

## 2020-07-16 PROCEDURE — 85027 COMPLETE CBC AUTOMATED: CPT

## 2020-07-16 PROCEDURE — 82043 UR ALBUMIN QUANTITATIVE: CPT

## 2020-07-16 PROCEDURE — 82570 ASSAY OF URINE CREATININE: CPT

## 2020-07-16 NOTE — PROGRESS NOTES
Patient came in today to get labs done, R antecubital was used to collect blood without complications. Specimen sent to the lab.
78

## 2020-07-20 ENCOUNTER — OFFICE VISIT (OUTPATIENT)
Dept: FAMILY MEDICINE CLINIC | Facility: CLINIC | Age: 70
End: 2020-07-20
Payer: MEDICARE

## 2020-07-20 VITALS
BODY MASS INDEX: 28.44 KG/M2 | HEIGHT: 69 IN | DIASTOLIC BLOOD PRESSURE: 70 MMHG | SYSTOLIC BLOOD PRESSURE: 112 MMHG | WEIGHT: 192 LBS | HEART RATE: 85 BPM | OXYGEN SATURATION: 95 %

## 2020-07-20 DIAGNOSIS — Z87.891 FORMER SMOKER: ICD-10-CM

## 2020-07-20 DIAGNOSIS — I70.0 AORTIC ATHEROSCLEROSIS (HCC): ICD-10-CM

## 2020-07-20 DIAGNOSIS — N40.0 BENIGN PROSTATIC HYPERPLASIA WITHOUT LOWER URINARY TRACT SYMPTOMS: ICD-10-CM

## 2020-07-20 DIAGNOSIS — Z00.00 PREVENTATIVE HEALTH CARE: Primary | ICD-10-CM

## 2020-07-20 DIAGNOSIS — Z00.00 ENCOUNTER FOR ANNUAL HEALTH EXAMINATION: ICD-10-CM

## 2020-07-20 DIAGNOSIS — E11.65 TYPE 2 DIABETES MELLITUS WITH HYPERGLYCEMIA, WITHOUT LONG-TERM CURRENT USE OF INSULIN (HCC): ICD-10-CM

## 2020-07-20 DIAGNOSIS — E11.9 CONTROLLED TYPE 2 DIABETES MELLITUS WITHOUT COMPLICATION, WITHOUT LONG-TERM CURRENT USE OF INSULIN (HCC): ICD-10-CM

## 2020-07-20 DIAGNOSIS — Z12.5 ENCOUNTER FOR SCREENING FOR MALIGNANT NEOPLASM OF PROSTATE: ICD-10-CM

## 2020-07-20 DIAGNOSIS — E78.2 MIXED HYPERLIPIDEMIA: ICD-10-CM

## 2020-07-20 PROCEDURE — G0439 PPPS, SUBSEQ VISIT: HCPCS | Performed by: INTERNAL MEDICINE

## 2020-07-20 RX ORDER — SILDENAFIL 50 MG/1
50 TABLET, FILM COATED ORAL
Qty: 30 TABLET | Refills: 0 | Status: SHIPPED | OUTPATIENT
Start: 2020-07-20

## 2020-07-20 RX ORDER — ATORVASTATIN CALCIUM 40 MG/1
40 TABLET, FILM COATED ORAL NIGHTLY
Qty: 90 TABLET | Refills: 1 | Status: SHIPPED | OUTPATIENT
Start: 2020-07-20 | End: 2021-01-12

## 2020-07-20 NOTE — PATIENT INSTRUCTIONS
[address] SCREENING SCHEDULE   Tests on this list are recommended by your physician but may not be covered, or covered at this frequency, by your insurer. Please check with your insurance carrier before scheduling to verify coverage.     P Covered every 10 years- more often if abnormal Colonoscopy due on 04/24/2028 Update Health Maintenance if applicable    Flex Sigmoidoscopy Screen  Covered every 5 years No results found for this or any previous visit. No flowsheet data found.      Fecal O Not covered by Medicare Part B) No orders found for this or any previous visit.  This may be covered with your prescription benefits, but Medicare does not cover unless Medically needed    Zoster (Not covered by Medicare Part B) No orders found for this or at least every 3 years,         Glucose (mg/dL)   Date Value   07/16/2020 126 (H)    Medicare covers annually or at 6-month intervals for prediabetic patients        Cardiovascular Disease Screening     Cholesterol, covered every 5 yrs including Total, LDL Annually (age 48 or over), TEODORO not paid separately when covered E/M service is provided on same date    Immunizations      Influenza  Covered Annually Orders placed or performed in visit on 09/30/16   • FLU VACC [address] ANTI    Please get every year available in [address])  [email or web address]  This link also has information from the [address] [place] [address] regarding Advance Directives.

## 2020-07-23 NOTE — PROGRESS NOTES
HPI:   Elvis Alejandre is a 72year old male who presents for a Medicare Initial Annual Wellness visit (Once after 12 month Medicare anniversary) .       His last annual assessment has been over 1 year: Annual Physical due on 05/10/2020   has a Yola Murguia was screened for Alcohol abuse and had a score of 0 so is at low risk.      Patient Care Team: Patient Care Team:  Camryn Anaya MD as PCP - General (Family Medicine)  Camryn Anaya MD as PCP - [facility]    Patient Active Problem TIMES DAILY WITH MEALS  loratadine 10 MG Oral Tab, Take 1 tablet (10 mg total) by mouth daily. Fluticasone Propionate 50 MCG/ACT Nasal Suspension, 2 sprays by Each Nare route daily.   tamsulosin (FLOMAX) cap, Take 1 capsule (0.4 mg total) by mouth 2 (two) calculated from the following:    Height as of this encounter: 69\". Weight as of this encounter: 192 lb (87.1 kg).       /70 (BP Location: Right arm, Patient Position: Sitting, Cuff Size: adult)   Pulse 85   Ht 69\"   Wt 192 lb (87.1 kg)   SpO2 95 blood draw. -     PSA SCREEN; Standing    Type 2 diabetes mellitus with hyperglycemia, without long-term current use of insulin (HCC)  -Well-controlled. Not insulin regimen is metformin 1000 mg twice daily. A1c recently checked was 6.1.   Following a hea only, or if medically necessary Electrocardiogram date    Colorectal Cancer Screening      Colonoscopy Screen every 10 years Colonoscopy due on 04/24/2028 Update Health Maintenance if applicable    Flex Sigmoidoscopy Screen every 10 years No results found Calc (ug/mg)   Date Value   07/16/2020 5.3        LDL  Annually LDL Cholesterol (mg/dL)   Date Value   07/16/2020 103 (H)    No flowsheet data found.      Dilated Eye exam  Annually Data entered on: 5/27/2019   Last Dilated Eye Exam 4/5/2019     No flowshee

## 2020-08-31 DIAGNOSIS — J30.2 SEASONAL ALLERGIC RHINITIS, UNSPECIFIED TRIGGER: ICD-10-CM

## 2020-08-31 RX ORDER — FLUTICASONE PROPIONATE 50 MCG
SPRAY, SUSPENSION (ML) NASAL
Qty: 16 G | Refills: 0 | Status: SHIPPED | OUTPATIENT
Start: 2020-08-31 | End: 2020-10-20 | Stop reason: ALTCHOICE

## 2020-10-14 ENCOUNTER — NURSE ONLY (OUTPATIENT)
Dept: FAMILY MEDICINE CLINIC | Facility: CLINIC | Age: 70
End: 2020-10-14
Payer: MEDICARE

## 2020-10-14 DIAGNOSIS — Z12.5 ENCOUNTER FOR SCREENING FOR MALIGNANT NEOPLASM OF PROSTATE: ICD-10-CM

## 2020-10-14 DIAGNOSIS — N40.0 BENIGN PROSTATIC HYPERPLASIA WITHOUT LOWER URINARY TRACT SYMPTOMS: ICD-10-CM

## 2020-10-14 DIAGNOSIS — E11.65 TYPE 2 DIABETES MELLITUS WITH HYPERGLYCEMIA, WITHOUT LONG-TERM CURRENT USE OF INSULIN (HCC): Primary | ICD-10-CM

## 2020-10-14 DIAGNOSIS — Z00.00 PREVENTATIVE HEALTH CARE: ICD-10-CM

## 2020-10-14 PROCEDURE — 83036 HEMOGLOBIN GLYCOSYLATED A1C: CPT | Performed by: INTERNAL MEDICINE

## 2020-10-14 PROCEDURE — 80053 COMPREHEN METABOLIC PANEL: CPT | Performed by: INTERNAL MEDICINE

## 2020-10-14 PROCEDURE — 80061 LIPID PANEL: CPT | Performed by: INTERNAL MEDICINE

## 2020-10-14 PROCEDURE — 36415 COLL VENOUS BLD VENIPUNCTURE: CPT | Performed by: INTERNAL MEDICINE

## 2020-10-20 ENCOUNTER — OFFICE VISIT (OUTPATIENT)
Dept: FAMILY MEDICINE CLINIC | Facility: CLINIC | Age: 70
End: 2020-10-20
Payer: MEDICARE

## 2020-10-20 VITALS
SYSTOLIC BLOOD PRESSURE: 114 MMHG | BODY MASS INDEX: 28 KG/M2 | WEIGHT: 188 LBS | HEART RATE: 84 BPM | DIASTOLIC BLOOD PRESSURE: 70 MMHG | OXYGEN SATURATION: 99 %

## 2020-10-20 DIAGNOSIS — Z01.00 DIABETIC EYE EXAM (HCC): ICD-10-CM

## 2020-10-20 DIAGNOSIS — R97.20 BPH WITH ELEVATED PSA: ICD-10-CM

## 2020-10-20 DIAGNOSIS — N40.0 BPH WITH ELEVATED PSA: ICD-10-CM

## 2020-10-20 DIAGNOSIS — E11.9 CONTROLLED TYPE 2 DIABETES MELLITUS WITHOUT COMPLICATION, WITHOUT LONG-TERM CURRENT USE OF INSULIN (HCC): Primary | ICD-10-CM

## 2020-10-20 DIAGNOSIS — N40.1 BPH ASSOCIATED WITH NOCTURIA: ICD-10-CM

## 2020-10-20 DIAGNOSIS — R35.1 BPH ASSOCIATED WITH NOCTURIA: ICD-10-CM

## 2020-10-20 DIAGNOSIS — Z11.59 NEED FOR HEPATITIS C SCREENING TEST: ICD-10-CM

## 2020-10-20 DIAGNOSIS — E11.9 DIABETIC EYE EXAM (HCC): ICD-10-CM

## 2020-10-20 DIAGNOSIS — J30.9 ALLERGIC RHINITIS, UNSPECIFIED SEASONALITY, UNSPECIFIED TRIGGER: ICD-10-CM

## 2020-10-20 PROCEDURE — 99214 OFFICE O/P EST MOD 30 MIN: CPT | Performed by: INTERNAL MEDICINE

## 2020-10-20 PROCEDURE — 3078F DIAST BP <80 MM HG: CPT | Performed by: INTERNAL MEDICINE

## 2020-10-20 PROCEDURE — 3074F SYST BP LT 130 MM HG: CPT | Performed by: INTERNAL MEDICINE

## 2020-10-20 RX ORDER — TAMSULOSIN HYDROCHLORIDE 0.4 MG/1
0.4 CAPSULE ORAL 2 TIMES DAILY
Qty: 180 CAPSULE | Refills: 0 | Status: SHIPPED | OUTPATIENT
Start: 2020-10-20 | End: 2020-11-30

## 2020-10-20 RX ORDER — AZELASTINE 1 MG/ML
1 SPRAY, METERED NASAL 2 TIMES DAILY
Qty: 1 BOTTLE | Refills: 1 | Status: SHIPPED | OUTPATIENT
Start: 2020-10-20 | End: 2020-10-21

## 2020-10-21 ENCOUNTER — TELEPHONE (OUTPATIENT)
Dept: FAMILY MEDICINE CLINIC | Facility: CLINIC | Age: 70
End: 2020-10-21

## 2020-10-21 RX ORDER — AZELASTINE 1 MG/ML
1 SPRAY, METERED NASAL 2 TIMES DAILY
Qty: 1 BOTTLE | Refills: 1 | Status: SHIPPED | OUTPATIENT
Start: 2020-10-21 | End: 2020-12-28 | Stop reason: ALTCHOICE

## 2020-10-21 NOTE — TELEPHONE ENCOUNTER
Pt came in requesting for his medication Azelastine HCl 0.1 % Nasal Solution to be sent to [facility] instead.    Pt wants to be informed when this has been sent

## 2020-11-02 NOTE — PROGRESS NOTES
[facility]  Return Patient Progress Note      HPI:   Patient presents with:  Test Results  Diabetes      Greg Beltran is a 72year old male presenting for: Follow up.      Has a significant  has a past medical history of BP mg/dL       Labs:   CMP:  Lab Results   Component Value Date/Time     10/14/2020 06:20 PM    K 4.2 10/14/2020 06:20 PM     10/14/2020 06:20 PM    CO2 29.0 10/14/2020 06:20 PM    CREATSERUM 0.91 10/14/2020 06:20 PM    CA 9.2 10/14/2020 06:20 PM Herpes zoster without complication    • Hyperlipidemia    • Overweight    • Pneumonia    • Postnasal drip    • Retention of urine, unspecified    • Skin lesion    • Skin tag    • Urinary frequency          PSH:  Past Surgical History:   Procedure Lateralit allergies and immunocompromised state. Neurological: Negative for dizziness, seizures, facial asymmetry, speech difficulty, weakness, light-headedness, numbness and headaches. Hematological: Negative for adenopathy. Does not bruise/bleed easily.    Psyc Neurological: He is alert and oriented to person, place, and time. He has normal reflexes. No cranial nerve deficit. Skin: Skin is warm. No rash noted. No erythema. Psychiatric: He has a normal mood and affect.  His behavior is normal. Judgment and th metFORMIN HCl 1000 MG Oral Tab 180 tablet 0     Sig: Take 1 tablet (1,000 mg total) by mouth 2 (two) times daily with meals.        Orders Placed This Encounter      Lipid Panel [E]      CBC W Differential W Platelet [E]      Comp Metabolic Panel (14) [E]

## 2020-11-24 ENCOUNTER — TELEPHONE (OUTPATIENT)
Dept: FAMILY MEDICINE CLINIC | Facility: CLINIC | Age: 70
End: 2020-11-24

## 2020-11-29 ENCOUNTER — TELEPHONE (OUTPATIENT)
Dept: FAMILY MEDICINE CLINIC | Facility: CLINIC | Age: 70
End: 2020-11-29

## 2020-11-29 DIAGNOSIS — R97.20 BPH WITH ELEVATED PSA: ICD-10-CM

## 2020-11-29 DIAGNOSIS — R35.1 BPH ASSOCIATED WITH NOCTURIA: ICD-10-CM

## 2020-11-29 DIAGNOSIS — N40.0 BPH WITH ELEVATED PSA: ICD-10-CM

## 2020-11-29 DIAGNOSIS — N40.1 BPH ASSOCIATED WITH NOCTURIA: ICD-10-CM

## 2020-11-30 RX ORDER — BLOOD SUGAR DIAGNOSTIC
1 STRIP MISCELLANEOUS 2 TIMES DAILY
Qty: 100 STRIP | Refills: 5 | Status: SHIPPED | OUTPATIENT
Start: 2020-11-30

## 2020-11-30 RX ORDER — TAMSULOSIN HYDROCHLORIDE 0.4 MG/1
0.4 CAPSULE ORAL 2 TIMES DAILY
Qty: 180 CAPSULE | Refills: 0 | Status: SHIPPED | OUTPATIENT
Start: 2020-11-30 | End: 2020-12-03

## 2020-11-30 RX ORDER — TAMSULOSIN HYDROCHLORIDE 0.4 MG/1
0.4 CAPSULE ORAL 2 TIMES DAILY
Qty: 180 CAPSULE | Refills: 0 | Status: SHIPPED | OUTPATIENT
Start: 2020-11-30 | End: 2020-11-30

## 2020-11-30 NOTE — TELEPHONE ENCOUNTER
Pt called requesting previous medication and for Glucose Blood (CONTOUR NEXT TEST) In Vitro Strip    To please send it to pharmacy

## 2020-11-30 NOTE — TELEPHONE ENCOUNTER
Test strip were authorized, patient notified.     Patient's wife is claiming that he is paying a copay because the PCP is Dr Kadie Cheatham and Rx was sent under Dr Deleon, Rx was sent again under her name but patient's wife is aware that if he has to pay a

## 2020-12-02 NOTE — TELEPHONE ENCOUNTER
Wife called stating that pharmacy needs a new prescription for the glucose monitor, needles and strips.   Pt was unable to get the strips  Also pt wants for his tamsulosin [facility]) cap to be sent to [address]  [facility]: [phone]  Fax: [phone]

## 2020-12-03 RX ORDER — TAMSULOSIN HYDROCHLORIDE 0.4 MG/1
0.4 CAPSULE ORAL 2 TIMES DAILY
Qty: 180 CAPSULE | Refills: 0 | Status: SHIPPED | OUTPATIENT
Start: 2020-12-03 | End: 2021-02-04

## 2020-12-03 NOTE — TELEPHONE ENCOUNTER
Returned call, they want the diabetic supplies to be sent to [facility] as well

## 2020-12-03 NOTE — TELEPHONE ENCOUNTER
Refill on Tamsulosin was authorized and sent to [address], I need to know if they want us to send his diabetic supplies to [facility] or local pharmacy.

## 2020-12-03 NOTE — TELEPHONE ENCOUNTER
Wife called back again and stated that she misunderstood and NOT to send the diabetic supplies to [facility]. Pt will wait for that and will follow up later.

## 2020-12-28 ENCOUNTER — TELEPHONE (OUTPATIENT)
Dept: FAMILY MEDICINE CLINIC | Facility: CLINIC | Age: 70
End: 2020-12-28

## 2020-12-28 RX ORDER — FLUTICASONE PROPIONATE 50 MCG
2 SPRAY, SUSPENSION (ML) NASAL DAILY
Qty: 1 BOTTLE | Refills: 0 | Status: SHIPPED | OUTPATIENT
Start: 2020-12-28 | End: 2021-04-22

## 2020-12-28 NOTE — TELEPHONE ENCOUNTER
Spoke to patient. Flonase e-rx to pharmacy. Also advised to get Claritin OTC to see if it will help. Has HX of allergic rhinitis. Patient verbalized understanding and will trial these.

## 2020-12-28 NOTE — TELEPHONE ENCOUNTER
Patient is c/o of runny nose only. He states he has been tested for covid 3 times and has been negative. Patient states Dr. Morenita Sawant has prescribed him a nasal spray but its not working. Wants to know what else can he take or recommend. Please advice.

## 2021-01-12 ENCOUNTER — TELEPHONE (OUTPATIENT)
Dept: FAMILY MEDICINE CLINIC | Facility: CLINIC | Age: 71
End: 2021-01-12

## 2021-01-12 DIAGNOSIS — J30.9 ALLERGIC RHINITIS, UNSPECIFIED SEASONALITY, UNSPECIFIED TRIGGER: Primary | ICD-10-CM

## 2021-01-12 RX ORDER — ATORVASTATIN CALCIUM 40 MG/1
40 TABLET, FILM COATED ORAL NIGHTLY
Qty: 90 TABLET | Refills: 0 | Status: SHIPPED | OUTPATIENT
Start: 2021-01-12 | End: 2021-04-21

## 2021-01-12 NOTE — TELEPHONE ENCOUNTER
PT called stating that his nose is still running, its been months like this and the medication prescribed Fluticasone Propionate 50 MCG/ACT Nasal Suspension is not working  Pt wants to be seen or if doctor can prescribe something else for this   Please jessee

## 2021-01-13 NOTE — TELEPHONE ENCOUNTER
Toya Butts MD  You 18 hours ago (2:07 PM)     Please have him see ENT. Charisma Ann you. Any provider is ok.      [address]    Message text       Prem Wan MD 23 hours ago (9:52 AM)     Would you like to do at least a virtual with him or another me

## 2021-01-21 ENCOUNTER — OFFICE VISIT (OUTPATIENT)
Dept: OTOLARYNGOLOGY | Facility: CLINIC | Age: 71
End: 2021-01-21
Payer: MEDICARE

## 2021-01-21 VITALS
SYSTOLIC BLOOD PRESSURE: 132 MMHG | BODY MASS INDEX: 26.66 KG/M2 | HEART RATE: 71 BPM | WEIGHT: 180 LBS | HEIGHT: 69 IN | DIASTOLIC BLOOD PRESSURE: 75 MMHG | TEMPERATURE: 98 F

## 2021-01-21 DIAGNOSIS — J34.2 DEVIATED SEPTUM: ICD-10-CM

## 2021-01-21 DIAGNOSIS — J31.0 RHINITIS, UNSPECIFIED TYPE: Primary | ICD-10-CM

## 2021-01-21 PROCEDURE — 3075F SYST BP GE 130 - 139MM HG: CPT | Performed by: OTOLARYNGOLOGY

## 2021-01-21 PROCEDURE — 3008F BODY MASS INDEX DOCD: CPT | Performed by: OTOLARYNGOLOGY

## 2021-01-21 PROCEDURE — 99203 OFFICE O/P NEW LOW 30 MIN: CPT | Performed by: OTOLARYNGOLOGY

## 2021-01-21 PROCEDURE — 3078F DIAST BP <80 MM HG: CPT | Performed by: OTOLARYNGOLOGY

## 2021-01-21 RX ORDER — IPRATROPIUM BROMIDE 42 UG/1
2 SPRAY, METERED NASAL 4 TIMES DAILY
Qty: 1 BOTTLE | Refills: 3 | Status: SHIPPED | OUTPATIENT
Start: 2021-01-21

## 2021-01-21 NOTE — PROGRESS NOTES
Martinez Cool is a 72year old male.  Patient presents with:  Nose Problem: referred by Dr Shreya Carrillo regarding allergic rhinitis, per pt runny nose for over 2 months     HPI:   For the last 2 months has been experiencing clear drainage from his nos Tobacco Use      Smoking status: Former Smoker        Packs/day: 0.25        Years: 20.00        Pack years: 5      Smokeless tobacco: Never Used      Tobacco comment: quit in 2015    Alcohol use: Yes      Comment: occasionally    Drug use: Not Currently him to further evaluate his condition. I also recommended a trial of Atrovent nasal spray to see if this may help his symptoms    2.  Deviated septum  Nasal septal deviation which is not really causing any significant symptoms for him at the present      T

## 2021-01-27 ENCOUNTER — TELEPHONE (OUTPATIENT)
Dept: INTERNAL MEDICINE CLINIC | Facility: CLINIC | Age: 71
End: 2021-01-27

## 2021-01-27 DIAGNOSIS — E11.9 CONTROLLED TYPE 2 DIABETES MELLITUS WITHOUT COMPLICATION, WITHOUT LONG-TERM CURRENT USE OF INSULIN (HCC): ICD-10-CM

## 2021-01-27 DIAGNOSIS — J30.9 ALLERGIC RHINITIS, UNSPECIFIED SEASONALITY, UNSPECIFIED TRIGGER: Primary | ICD-10-CM

## 2021-01-29 DIAGNOSIS — R35.1 BPH ASSOCIATED WITH NOCTURIA: ICD-10-CM

## 2021-01-29 DIAGNOSIS — R97.20 BPH WITH ELEVATED PSA: ICD-10-CM

## 2021-01-29 DIAGNOSIS — N40.1 BPH ASSOCIATED WITH NOCTURIA: ICD-10-CM

## 2021-01-29 DIAGNOSIS — N40.0 BPH WITH ELEVATED PSA: ICD-10-CM

## 2021-01-29 NOTE — TELEPHONE ENCOUNTER
Called patient and informed him that the referral has been approved. Specialist office will be able to view it in his chart. He verbalized understanding.

## 2021-02-04 RX ORDER — TAMSULOSIN HYDROCHLORIDE 0.4 MG/1
0.4 CAPSULE ORAL 2 TIMES DAILY
Qty: 180 CAPSULE | Refills: 0 | Status: SHIPPED | OUTPATIENT
Start: 2021-02-04 | End: 2021-06-17

## 2021-02-12 ENCOUNTER — TELEPHONE (OUTPATIENT)
Dept: FAMILY MEDICINE CLINIC | Facility: CLINIC | Age: 71
End: 2021-02-12

## 2021-02-12 NOTE — TELEPHONE ENCOUNTER
Ophthalmology consult with Dr. Christie Tilley  Diabetic flowsheet completed.     Other significant findings/diagnoses include:  Vitreous floaters  No diabetic retinopathy    Follow-up in: 1 year

## 2021-02-16 ENCOUNTER — OFFICE VISIT (OUTPATIENT)
Dept: ALLERGY | Facility: CLINIC | Age: 71
End: 2021-02-16
Payer: MEDICARE

## 2021-02-16 ENCOUNTER — NURSE ONLY (OUTPATIENT)
Dept: ALLERGY | Facility: CLINIC | Age: 71
End: 2021-02-16
Payer: MEDICARE

## 2021-02-16 VITALS
HEART RATE: 74 BPM | OXYGEN SATURATION: 97 % | DIASTOLIC BLOOD PRESSURE: 79 MMHG | BODY MASS INDEX: 26.66 KG/M2 | WEIGHT: 180 LBS | SYSTOLIC BLOOD PRESSURE: 147 MMHG | HEIGHT: 69 IN

## 2021-02-16 DIAGNOSIS — J31.0 NONALLERGIC RHINITIS: ICD-10-CM

## 2021-02-16 DIAGNOSIS — J30.89 PERENNIAL ALLERGIC RHINITIS: ICD-10-CM

## 2021-02-16 DIAGNOSIS — J30.89 PERENNIAL ALLERGIC RHINITIS: Primary | ICD-10-CM

## 2021-02-16 PROCEDURE — 95004 PERQ TESTS W/ALRGNC XTRCS: CPT | Performed by: ALLERGY & IMMUNOLOGY

## 2021-02-16 PROCEDURE — 3008F BODY MASS INDEX DOCD: CPT | Performed by: ALLERGY & IMMUNOLOGY

## 2021-02-16 PROCEDURE — 3077F SYST BP >= 140 MM HG: CPT | Performed by: ALLERGY & IMMUNOLOGY

## 2021-02-16 PROCEDURE — 99204 OFFICE O/P NEW MOD 45 MIN: CPT | Performed by: ALLERGY & IMMUNOLOGY

## 2021-02-16 PROCEDURE — 3078F DIAST BP <80 MM HG: CPT | Performed by: ALLERGY & IMMUNOLOGY

## 2021-02-16 RX ORDER — CETIRIZINE HYDROCHLORIDE 10 MG/1
10 TABLET ORAL DAILY
COMMUNITY
End: 2021-02-16

## 2021-02-16 RX ORDER — LEVOCETIRIZINE DIHYDROCHLORIDE 5 MG/1
5 TABLET, FILM COATED ORAL NIGHTLY
Qty: 30 TABLET | Refills: 0 | Status: SHIPPED | OUTPATIENT
Start: 2021-02-16 | End: 2021-04-22

## 2021-02-16 RX ORDER — BLOOD-GLUCOSE METER
EACH MISCELLANEOUS
COMMUNITY
Start: 2021-02-06

## 2021-02-16 RX ORDER — AZELASTINE 1 MG/ML
2 SPRAY, METERED NASAL 2 TIMES DAILY
COMMUNITY
End: 2021-04-22

## 2021-02-16 RX ORDER — MONTELUKAST SODIUM 10 MG/1
10 TABLET ORAL NIGHTLY
Qty: 30 TABLET | Refills: 0 | Status: SHIPPED | OUTPATIENT
Start: 2021-02-16 | End: 2021-04-21

## 2021-02-16 RX ORDER — ISOPROPYL ALCOHOL 0.75 G/1
SWAB TOPICAL
COMMUNITY
Start: 2021-02-12 | End: 2021-02-18

## 2021-02-16 NOTE — PATIENT INSTRUCTIONS
Recs:  Handouts on allergic versus nonallergic rhinitis provided and reviewed  Trial of Xyzal, levocetirizine 5 mg once a night at bedtime as a nonsedating antihistamine  Add Singulair, montelukast 10 mg in 1 to 2 weeks if not improving  Reviewed potential

## 2021-02-16 NOTE — PROGRESS NOTES
Eliz Black is a 72year old male. HPI:   Patient presents with:   Allergies: patient presents for runny nose since October 2020, nasal congestion is clear, no fever, denies sinus pressure, denies itchy eyes, referred by Dr. Netta Cabral for allergy Left     x3   • LIPOMA REMOVAL Left     arm   • LIPOMA REMOVAL      groin area   • TONSILLECTOMY        Family History   Problem Relation Age of Onset   • No Known Problems Mother    • No Known Problems Father    • No Known Problems Daughter    • No Known not apply Misc USE AS DIRECTED TWICE DAILY 100 each 5       Allergies:  No Known Allergies      ROS:     Allergic/Immuno:  See HPI  Cardiovascular:  Negative for irregular heartbeat/palpitations, chest pain, edema  Constitutional:  Negative night sweats,we persistent runny nose and nasal congestion. Patient has tried Flonase Astelin and Atrovent and Zyrtec without significant improvement. 1 dog in the home. Patient is a former smoker    Differential includes allergic versus nonallergic rhinitis.     Skin t

## 2021-02-18 ENCOUNTER — TELEPHONE (OUTPATIENT)
Dept: FAMILY MEDICINE CLINIC | Facility: CLINIC | Age: 71
End: 2021-02-18

## 2021-02-18 RX ORDER — LANCETS
1 EACH MISCELLANEOUS 2 TIMES DAILY
Qty: 1 BOX | Refills: 0 | Status: SHIPPED | OUTPATIENT
Start: 2021-02-18 | End: 2022-02-18

## 2021-02-18 RX ORDER — ISOPROPYL ALCOHOL 0.75 G/1
SWAB TOPICAL
Qty: 100 EACH | Refills: 0 | Status: SHIPPED | OUTPATIENT
Start: 2021-02-18

## 2021-03-04 DIAGNOSIS — Z23 NEED FOR VACCINATION: ICD-10-CM

## 2021-04-05 DIAGNOSIS — N40.0 BPH WITH ELEVATED PSA: ICD-10-CM

## 2021-04-05 DIAGNOSIS — N40.1 BPH ASSOCIATED WITH NOCTURIA: ICD-10-CM

## 2021-04-05 DIAGNOSIS — R35.1 BPH ASSOCIATED WITH NOCTURIA: ICD-10-CM

## 2021-04-05 DIAGNOSIS — R97.20 BPH WITH ELEVATED PSA: ICD-10-CM

## 2021-04-06 RX ORDER — TAMSULOSIN HYDROCHLORIDE 0.4 MG/1
CAPSULE ORAL
Qty: 180 CAPSULE | Refills: 0 | OUTPATIENT
Start: 2021-04-06

## 2021-04-12 RX ORDER — ATORVASTATIN CALCIUM 40 MG/1
40 TABLET, FILM COATED ORAL NIGHTLY
Qty: 90 TABLET | Refills: 0 | OUTPATIENT
Start: 2021-04-12

## 2021-04-19 ENCOUNTER — NURSE ONLY (OUTPATIENT)
Dept: FAMILY MEDICINE CLINIC | Facility: CLINIC | Age: 71
End: 2021-04-19
Payer: MEDICARE

## 2021-04-19 DIAGNOSIS — E11.9 CONTROLLED TYPE 2 DIABETES MELLITUS WITHOUT COMPLICATION, WITHOUT LONG-TERM CURRENT USE OF INSULIN (HCC): ICD-10-CM

## 2021-04-19 DIAGNOSIS — K64.4 EXTERNAL HEMORRHOIDS: ICD-10-CM

## 2021-04-19 DIAGNOSIS — Z11.59 NEED FOR HEPATITIS C SCREENING TEST: ICD-10-CM

## 2021-04-19 DIAGNOSIS — E66.3 OVERWEIGHT (BMI 25.0-29.9): Primary | ICD-10-CM

## 2021-04-19 DIAGNOSIS — E78.2 MIXED HYPERLIPIDEMIA: ICD-10-CM

## 2021-04-19 PROCEDURE — 36415 COLL VENOUS BLD VENIPUNCTURE: CPT | Performed by: INTERNAL MEDICINE

## 2021-04-19 NOTE — PROGRESS NOTES
MICRO,CBC,LIPID,A1C,CMP,LIPID, and HCV labs drawn per Dr Jules Henderson orders.  Patient tolerated lab draw well

## 2021-04-21 ENCOUNTER — OFFICE VISIT (OUTPATIENT)
Dept: FAMILY MEDICINE CLINIC | Facility: CLINIC | Age: 71
End: 2021-04-21
Payer: MEDICARE

## 2021-04-21 VITALS
BODY MASS INDEX: 27 KG/M2 | OXYGEN SATURATION: 98 % | DIASTOLIC BLOOD PRESSURE: 70 MMHG | WEIGHT: 185 LBS | SYSTOLIC BLOOD PRESSURE: 124 MMHG | HEART RATE: 74 BPM

## 2021-04-21 DIAGNOSIS — N40.0 BENIGN PROSTATIC HYPERPLASIA WITHOUT LOWER URINARY TRACT SYMPTOMS: ICD-10-CM

## 2021-04-21 DIAGNOSIS — E11.9 CONTROLLED TYPE 2 DIABETES MELLITUS WITHOUT COMPLICATION, WITHOUT LONG-TERM CURRENT USE OF INSULIN (HCC): Primary | ICD-10-CM

## 2021-04-21 PROCEDURE — 99213 OFFICE O/P EST LOW 20 MIN: CPT | Performed by: INTERNAL MEDICINE

## 2021-04-21 PROCEDURE — 3074F SYST BP LT 130 MM HG: CPT | Performed by: INTERNAL MEDICINE

## 2021-04-21 PROCEDURE — 3078F DIAST BP <80 MM HG: CPT | Performed by: INTERNAL MEDICINE

## 2021-04-21 RX ORDER — ATORVASTATIN CALCIUM 40 MG/1
40 TABLET, FILM COATED ORAL NIGHTLY
Qty: 90 TABLET | Refills: 0 | Status: SHIPPED | OUTPATIENT
Start: 2021-04-21 | End: 2021-07-14

## 2021-04-21 NOTE — PROGRESS NOTES
[facility]  Return Patient Progress Note      HPI:   Patient presents with:  Lab Results      Veronica Santiago is a 72year old male presenting for: Follow up.      Has a significant  has a past medical history of BPH without o mg/dL    ALKALINE PHOSPHATASE 53 35 - 144 U/L    AST 15 10 - 35 U/L    ALT 18 9 - 46 U/L   HEMOGLOBIN A1C   Result Value Ref Range    HEMOGLOBIN A1c 6.4 (H) <5.7 % of total Hgb   CBC WITH DIFFERENTIAL WITH PLATELET   Result Value Ref Range    WHITE BLOOD C AM        Medications:  Current Outpatient Medications   Medication Sig Dispense Refill   • atorvastatin 40 MG Oral Tab Take 1 tablet (40 mg total) by mouth nightly.  90 tablet 0   • metFORMIN HCl 1000 MG Oral Tab Take 1 tablet (1,000 mg total) by mouth 2 ( Overweight    • Pneumonia    • Postnasal drip    • Retention of urine, unspecified    • Skin lesion    • Skin tag    • Urinary frequency    • Vitreous floaters 01/28/2021         PSH:  Past Surgical History:   Procedure Laterality Date   • COLONOSCOPY  1/3 for food allergies and immunocompromised state. Neurological: Negative for dizziness, seizures, facial asymmetry, speech difficulty, weakness, light-headedness, numbness and headaches. Hematological: Negative for adenopathy.  Does not bruise/bleed easil Neurological: He is alert and oriented to person, place, and time. He has normal reflexes. No cranial nerve deficit. Skin: Skin is warm. No rash noted. No erythema. Psychiatric: He has a normal mood and affect.  His behavior is normal. Judgment and th side effects or complications from the treatments . Red flags/ ER precautions discussed.         Sumit Choi MD  Internal Medicine/Primary Care  [place] [id number]

## 2021-04-22 ENCOUNTER — OFFICE VISIT (OUTPATIENT)
Dept: ALLERGY | Facility: CLINIC | Age: 71
End: 2021-04-22
Payer: MEDICARE

## 2021-04-22 VITALS
SYSTOLIC BLOOD PRESSURE: 145 MMHG | HEIGHT: 68.5 IN | RESPIRATION RATE: 20 BRPM | TEMPERATURE: 98 F | DIASTOLIC BLOOD PRESSURE: 79 MMHG | OXYGEN SATURATION: 98 % | HEART RATE: 78 BPM | BODY MASS INDEX: 27.87 KG/M2 | WEIGHT: 186 LBS

## 2021-04-22 DIAGNOSIS — J32.2 CHRONIC ETHMOIDAL SINUSITIS: Primary | ICD-10-CM

## 2021-04-22 DIAGNOSIS — J31.0 NONALLERGIC RHINITIS: ICD-10-CM

## 2021-04-22 PROCEDURE — 99214 OFFICE O/P EST MOD 30 MIN: CPT | Performed by: ALLERGY & IMMUNOLOGY

## 2021-04-22 PROCEDURE — 3077F SYST BP >= 140 MM HG: CPT | Performed by: ALLERGY & IMMUNOLOGY

## 2021-04-22 PROCEDURE — 3008F BODY MASS INDEX DOCD: CPT | Performed by: ALLERGY & IMMUNOLOGY

## 2021-04-22 PROCEDURE — 3078F DIAST BP <80 MM HG: CPT | Performed by: ALLERGY & IMMUNOLOGY

## 2021-04-22 RX ORDER — LEVOCETIRIZINE DIHYDROCHLORIDE 5 MG/1
5 TABLET, FILM COATED ORAL NIGHTLY
Qty: 90 TABLET | Refills: 1 | Status: SHIPPED | OUTPATIENT
Start: 2021-04-22

## 2021-04-22 RX ORDER — MONTELUKAST SODIUM 10 MG/1
10 TABLET ORAL NIGHTLY
Qty: 90 TABLET | Refills: 1 | Status: SHIPPED | OUTPATIENT
Start: 2021-04-22

## 2021-04-22 NOTE — PATIENT INSTRUCTIONS
Recs:  Continue with Xyzal, levocetirizine 5 mg once a night at bedtime  Continue with Singulair, montelukast 10 mg once a day  May continue with sinus rinses as needed  Check CT of sinuses to screen for chronic sinusitis or anatomical issue for his persis

## 2021-04-22 NOTE — PROGRESS NOTES
Ronald Mcwilliams is a 72year old male. HPI:   Patient presents with: Allergies: Patient presents for 2 month f/u, states that he continue to experience rhinitis.      Patient is a 71-year-old male who presents for follow-up with a chief complain colonoscopy after 1/31/23   • INGUINAL HERNIA REPAIR Left     x3   • LIPOMA REMOVAL Left     arm   • LIPOMA REMOVAL      groin area   • TONSILLECTOMY        Family History   Problem Relation Age of Onset   • No Known Problems Mother    • No Known Problems Azelastine HCl 0.1 % Nasal Solution 2 sprays by Nasal route 2 (two) times daily. (Patient not taking: Reported on 4/22/2021 )     • Fluticasone Propionate 50 MCG/ACT Nasal Suspension 2 sprays by Each Nare route daily.  (Patient not taking: Reported on 4/21/ and Singulair.   Defers intranasal sprays at this time as they did not provide relief    Recs:  Continue with Xyzal, levocetirizine 5 mg once a night at bedtime  Continue with Singulair, montelukast 10 mg once a day  May continue with sinus rinses as needed

## 2021-06-17 ENCOUNTER — TELEPHONE (OUTPATIENT)
Dept: FAMILY MEDICINE CLINIC | Facility: CLINIC | Age: 71
End: 2021-06-17

## 2021-06-17 DIAGNOSIS — R97.20 BPH WITH ELEVATED PSA: ICD-10-CM

## 2021-06-17 DIAGNOSIS — R35.1 BPH ASSOCIATED WITH NOCTURIA: ICD-10-CM

## 2021-06-17 DIAGNOSIS — N40.1 BPH ASSOCIATED WITH NOCTURIA: ICD-10-CM

## 2021-06-17 DIAGNOSIS — N40.0 BPH WITH ELEVATED PSA: ICD-10-CM

## 2021-06-17 RX ORDER — TAMSULOSIN HYDROCHLORIDE 0.4 MG/1
0.4 CAPSULE ORAL 2 TIMES DAILY
Qty: 180 CAPSULE | Refills: 1 | Status: SHIPPED | OUTPATIENT
Start: 2021-06-17 | End: 2021-12-03

## 2021-06-17 NOTE — TELEPHONE ENCOUNTER
Pt called requesting refills for tamsulosin [facility]) cap  Pt wants to be informed when this has been sent

## 2021-07-14 RX ORDER — ATORVASTATIN CALCIUM 40 MG/1
40 TABLET, FILM COATED ORAL NIGHTLY
Qty: 90 TABLET | Refills: 0 | Status: SHIPPED | OUTPATIENT
Start: 2021-07-14 | End: 2021-12-03

## 2021-08-05 DIAGNOSIS — E11.9 CONTROLLED TYPE 2 DIABETES MELLITUS WITHOUT COMPLICATION, WITHOUT LONG-TERM CURRENT USE OF INSULIN (HCC): ICD-10-CM

## 2021-09-07 ENCOUNTER — TELEPHONE (OUTPATIENT)
Dept: FAMILY MEDICINE CLINIC | Facility: CLINIC | Age: 71
End: 2021-09-07

## 2021-09-07 NOTE — TELEPHONE ENCOUNTER
Patient is concerned about having low blood pressure but is unsure how low it gets, he was asked to check his b/p daily and to give us a call in case he needs to be seen because is too low.

## 2021-09-13 ENCOUNTER — TELEPHONE (OUTPATIENT)
Dept: FAMILY MEDICINE CLINIC | Facility: CLINIC | Age: 71
End: 2021-09-13

## 2021-09-13 ENCOUNTER — HOSPITAL ENCOUNTER (OUTPATIENT)
Age: 71
Discharge: ANOTHER HOSPITAL | End: 2021-09-13
Payer: MEDICARE

## 2021-09-13 VITALS
TEMPERATURE: 97 F | RESPIRATION RATE: 17 BRPM | OXYGEN SATURATION: 99 % | DIASTOLIC BLOOD PRESSURE: 74 MMHG | HEART RATE: 62 BPM | SYSTOLIC BLOOD PRESSURE: 153 MMHG

## 2021-09-13 DIAGNOSIS — R42 DIZZINESS: Primary | ICD-10-CM

## 2021-09-13 DIAGNOSIS — R07.9 CHEST PAIN OF UNCERTAIN ETIOLOGY: ICD-10-CM

## 2021-09-13 PROCEDURE — 99214 OFFICE O/P EST MOD 30 MIN: CPT | Performed by: NURSE PRACTITIONER

## 2021-09-13 PROCEDURE — 93000 ELECTROCARDIOGRAM COMPLETE: CPT | Performed by: NURSE PRACTITIONER

## 2021-09-13 NOTE — TELEPHONE ENCOUNTER
For about a month now patient has been having dizziness, loss of appetite, headaches, and his bp has been up and down. Patient is concerned it might be anemia or his bp. Wants to be seen this week. Please advice.

## 2021-09-13 NOTE — ED INITIAL ASSESSMENT (HPI)
Pt here with c/o dizziness for a month, intermittent confusion, chest pain, elevated BP, blurry vision and loss of appetite.  Pt was sent by GLOR[facility]

## 2021-10-12 RX ORDER — ATORVASTATIN CALCIUM 40 MG/1
40 TABLET, FILM COATED ORAL NIGHTLY
Qty: 90 TABLET | Refills: 0 | OUTPATIENT
Start: 2021-10-12

## 2021-10-19 DIAGNOSIS — E11.9 CONTROLLED TYPE 2 DIABETES MELLITUS WITHOUT COMPLICATION, WITHOUT LONG-TERM CURRENT USE OF INSULIN (HCC): ICD-10-CM

## 2021-10-19 RX ORDER — ATORVASTATIN CALCIUM 40 MG/1
40 TABLET, FILM COATED ORAL NIGHTLY
Qty: 90 TABLET | Refills: 0 | OUTPATIENT
Start: 2021-10-19

## 2021-10-26 DIAGNOSIS — E11.9 CONTROLLED TYPE 2 DIABETES MELLITUS WITHOUT COMPLICATION, WITHOUT LONG-TERM CURRENT USE OF INSULIN (HCC): ICD-10-CM

## 2021-10-26 RX ORDER — ATORVASTATIN CALCIUM 40 MG/1
40 TABLET, FILM COATED ORAL NIGHTLY
Qty: 90 TABLET | Refills: 0 | OUTPATIENT
Start: 2021-10-26

## 2021-11-23 ENCOUNTER — TELEPHONE (OUTPATIENT)
Dept: ALLERGY | Facility: CLINIC | Age: 71
End: 2021-11-23

## 2021-11-23 NOTE — TELEPHONE ENCOUNTER
CT Scan of sinus from 4/22/2021 have not been completed. Letter sent home. Postponed x 2 months. Dr. Zak Mars, if labs have not been completed in that time okay to cancel?

## 2021-11-26 DIAGNOSIS — R97.20 BPH WITH ELEVATED PSA: ICD-10-CM

## 2021-11-26 DIAGNOSIS — N40.1 BPH ASSOCIATED WITH NOCTURIA: ICD-10-CM

## 2021-11-26 DIAGNOSIS — N40.0 BPH WITH ELEVATED PSA: ICD-10-CM

## 2021-11-26 DIAGNOSIS — R35.1 BPH ASSOCIATED WITH NOCTURIA: ICD-10-CM

## 2021-11-29 RX ORDER — TAMSULOSIN HYDROCHLORIDE 0.4 MG/1
0.4 CAPSULE ORAL 2 TIMES DAILY
Qty: 180 CAPSULE | Refills: 1 | OUTPATIENT
Start: 2021-11-29

## 2021-11-30 ENCOUNTER — TELEPHONE (OUTPATIENT)
Dept: FAMILY MEDICINE CLINIC | Facility: CLINIC | Age: 71
End: 2021-11-30

## 2021-11-30 DIAGNOSIS — N40.1 BPH ASSOCIATED WITH NOCTURIA: ICD-10-CM

## 2021-11-30 DIAGNOSIS — R97.20 BPH WITH ELEVATED PSA: ICD-10-CM

## 2021-11-30 DIAGNOSIS — E11.9 CONTROLLED TYPE 2 DIABETES MELLITUS WITHOUT COMPLICATION, WITHOUT LONG-TERM CURRENT USE OF INSULIN (HCC): ICD-10-CM

## 2021-11-30 DIAGNOSIS — N40.0 BPH WITH ELEVATED PSA: ICD-10-CM

## 2021-11-30 DIAGNOSIS — R35.1 BPH ASSOCIATED WITH NOCTURIA: ICD-10-CM

## 2021-12-01 NOTE — TELEPHONE ENCOUNTER
Called patient to try and schedule an appointment for annual medicare physical exam.     Patient stated he moved out of [facility] to [place]. He already called his insurance to change pcp on card but wont be able to see his new pcp until January.        Patient i

## 2021-12-01 NOTE — TELEPHONE ENCOUNTER
Called patient to schedule an appointment for annual physical medicare. Patient states he moved out of state to [place]. He states he did call his insurance and changed pcp.

## 2021-12-03 RX ORDER — ATORVASTATIN CALCIUM 40 MG/1
40 TABLET, FILM COATED ORAL NIGHTLY
Qty: 90 TABLET | Refills: 0 | Status: SHIPPED | OUTPATIENT
Start: 2021-12-03

## 2021-12-03 RX ORDER — TAMSULOSIN HYDROCHLORIDE 0.4 MG/1
0.4 CAPSULE ORAL 2 TIMES DAILY
Qty: 180 CAPSULE | Refills: 0 | Status: SHIPPED | OUTPATIENT
Start: 2021-12-03

## 2021-12-08 DIAGNOSIS — R97.20 BPH WITH ELEVATED PSA: ICD-10-CM

## 2021-12-08 DIAGNOSIS — N40.0 BPH WITH ELEVATED PSA: ICD-10-CM

## 2021-12-08 DIAGNOSIS — R35.1 BPH ASSOCIATED WITH NOCTURIA: ICD-10-CM

## 2021-12-08 DIAGNOSIS — N40.1 BPH ASSOCIATED WITH NOCTURIA: ICD-10-CM

## 2021-12-08 RX ORDER — TAMSULOSIN HYDROCHLORIDE 0.4 MG/1
0.4 CAPSULE ORAL 2 TIMES DAILY
Qty: 180 CAPSULE | Refills: 0 | OUTPATIENT
Start: 2021-12-08

## 2022-09-06 ENCOUNTER — TELEPHONE (OUTPATIENT)
Dept: INTERNAL MEDICINE CLINIC | Facility: CLINIC | Age: 72
End: 2022-09-06

## (undated) NOTE — LETTER
11/23/2021              [address] [place] [address]         Dear [place],    [facility] records indicate that the tests ordered for you by Kimberlee Aguilar MD  have not been done.   If you have, in fact, alread

## (undated) NOTE — MR AVS SNAPSHOT
[address] at [facility] [place]  [address]  [facility], [address] [facility] [place]                Thank you for choosing us for your health care visit with Chaitanya Galvin MD.  We are glad to serve you and happy to sarah Microalb/Creat Ratio, Random Urine [E]    Complete by:  Aug 02, 2017    Assoc Dx:  Uncontrolled type 2 diabetes mellitus without complication, without long-term current use of insulin ([facility] [E11.65]                 Follow-up Instructions     Return in abo Mixed hyperlipidemia    Multiple joint pain    Nocturia    Other seborrheic keratosis    Overweight    Presbyopia    Uncontrolled type 2 diabetes mellitus without complication, without long-term current use of insulin (HCC)      Instructions and Informati except at the [place] [facility] Visit    Abdominal aortic aneurysm screening (once between ages 73-68)  No results found for this or any previous visit.  Limited to patients who meet one of the following criteria:   • Men who are 73-68 years old and have previous visit.  Medium/high risk factors:   End-stage renal disease   Hemophiliacs who received Factor VIII or IX concentrates   Clients of institutions for the mentally retarded   Persons who live in the same house as a HepB virus carrier   Homosexual men Place 1 Application rectally 4 (four) times daily as needed for Hemorrhoids.    Commonly known as:  ANALPRAM-HC           ibuprofen 600 MG Tabs   TK 1 T PO Q 6 H PRN   Commonly known as:  MOTRIN           MetFORMIN HCl 1000 MG Tabs   Take 1 tablet (1,000 mg

## (undated) NOTE — LETTER
06/11/20        [address]  [address] [facility] [address],    Nuestros registros indican que tiene análisis clínicos pendientes y/o pruebas que se ordenaron en lal nombre que no se estes completado.

## (undated) NOTE — MR AVS SNAPSHOT
[address] at [facility]  [address]  [facility], [address] [facility] [place]                Thank you for choosing us for your health care visit with Baljeet Olsen MD.  We are glad to serve you and happy to sarah Pioglitazone HCl 15 MG Tabs   Take 1 tablet (15 mg total) by mouth daily.    Commonly known as:  ACTOS           tamsulosin HCl 0.4 MG Caps   TK 2 CS  PO QHS   Commonly known as:  FLOMAX                Where to Get Your Medications      These medica

## (undated) NOTE — LETTER
[facility]  [address],  [place]       01/21/21        Patient: Merline Agent   YOB: 1950   Date of Visit: 1/21/2021       Dear  Dr. Krista Martin MD,      Thank you